# Patient Record
Sex: FEMALE | Race: ASIAN | NOT HISPANIC OR LATINO | URBAN - METROPOLITAN AREA
[De-identification: names, ages, dates, MRNs, and addresses within clinical notes are randomized per-mention and may not be internally consistent; named-entity substitution may affect disease eponyms.]

---

## 2021-02-19 ENCOUNTER — INPATIENT (INPATIENT)
Facility: HOSPITAL | Age: 31
LOS: 3 days | Discharge: HOME | End: 2021-02-23
Attending: OBSTETRICS & GYNECOLOGY | Admitting: OBSTETRICS & GYNECOLOGY
Payer: MEDICAID

## 2021-02-19 VITALS
HEART RATE: 109 BPM | RESPIRATION RATE: 16 BRPM | TEMPERATURE: 99 F | DIASTOLIC BLOOD PRESSURE: 77 MMHG | OXYGEN SATURATION: 99 % | SYSTOLIC BLOOD PRESSURE: 119 MMHG

## 2021-02-19 DIAGNOSIS — Z3A.00 WEEKS OF GESTATION OF PREGNANCY NOT SPECIFIED: ICD-10-CM

## 2021-02-19 DIAGNOSIS — O26.899 OTHER SPECIFIED PREGNANCY RELATED CONDITIONS, UNSPECIFIED TRIMESTER: ICD-10-CM

## 2021-02-19 DIAGNOSIS — Z34.80 ENCOUNTER FOR SUPERVISION OF OTHER NORMAL PREGNANCY, UNSPECIFIED TRIMESTER: ICD-10-CM

## 2021-02-19 RX ORDER — SODIUM CHLORIDE 9 MG/ML
1000 INJECTION, SOLUTION INTRAVENOUS
Refills: 0 | Status: DISCONTINUED | OUTPATIENT
Start: 2021-02-19 | End: 2021-02-20

## 2021-02-19 RX ORDER — AMPICILLIN TRIHYDRATE 250 MG
1 CAPSULE ORAL EVERY 4 HOURS
Refills: 0 | Status: DISCONTINUED | OUTPATIENT
Start: 2021-02-19 | End: 2021-02-20

## 2021-02-19 RX ORDER — AMPICILLIN TRIHYDRATE 250 MG
2 CAPSULE ORAL ONCE
Refills: 0 | Status: COMPLETED | OUTPATIENT
Start: 2021-02-19 | End: 2021-02-19

## 2021-02-19 RX ORDER — OXYTOCIN 10 UNIT/ML
333.33 VIAL (ML) INJECTION
Qty: 20 | Refills: 0 | Status: DISCONTINUED | OUTPATIENT
Start: 2021-02-19 | End: 2021-02-23

## 2021-02-19 RX ORDER — CITRIC ACID/SODIUM CITRATE 300-500 MG
15 SOLUTION, ORAL ORAL EVERY 6 HOURS
Refills: 0 | Status: DISCONTINUED | OUTPATIENT
Start: 2021-02-19 | End: 2021-02-20

## 2021-02-20 LAB
BASOPHILS # BLD AUTO: 0.02 K/UL — SIGNIFICANT CHANGE UP (ref 0–0.2)
BASOPHILS NFR BLD AUTO: 0.2 % — SIGNIFICANT CHANGE UP (ref 0–2)
BLD GP AB SCN SERPL QL: NEGATIVE — SIGNIFICANT CHANGE UP
EOSINOPHIL # BLD AUTO: 0.03 K/UL — SIGNIFICANT CHANGE UP (ref 0–0.5)
EOSINOPHIL NFR BLD AUTO: 0.3 % — SIGNIFICANT CHANGE UP (ref 0–6)
HCT VFR BLD CALC: 35.3 % — SIGNIFICANT CHANGE UP (ref 34.5–45)
HGB BLD-MCNC: 11.5 G/DL — SIGNIFICANT CHANGE UP (ref 11.5–15.5)
IMM GRANULOCYTES NFR BLD AUTO: 1.4 % — SIGNIFICANT CHANGE UP (ref 0–1.5)
LYMPHOCYTES # BLD AUTO: 17.7 % — SIGNIFICANT CHANGE UP (ref 13–44)
LYMPHOCYTES # BLD AUTO: 2.04 K/UL — SIGNIFICANT CHANGE UP (ref 1–3.3)
MCHC RBC-ENTMCNC: 30.7 PG — SIGNIFICANT CHANGE UP (ref 27–34)
MCHC RBC-ENTMCNC: 32.6 GM/DL — SIGNIFICANT CHANGE UP (ref 32–36)
MCV RBC AUTO: 94.1 FL — SIGNIFICANT CHANGE UP (ref 80–100)
MONOCYTES # BLD AUTO: 0.92 K/UL — HIGH (ref 0–0.9)
MONOCYTES NFR BLD AUTO: 8 % — SIGNIFICANT CHANGE UP (ref 2–14)
NEUTROPHILS # BLD AUTO: 8.38 K/UL — HIGH (ref 1.8–7.4)
NEUTROPHILS NFR BLD AUTO: 72.4 % — SIGNIFICANT CHANGE UP (ref 43–77)
NRBC # BLD: 0 /100 WBCS — SIGNIFICANT CHANGE UP (ref 0–0)
PLATELET # BLD AUTO: 247 K/UL — SIGNIFICANT CHANGE UP (ref 150–400)
RBC # BLD: 3.75 M/UL — LOW (ref 3.8–5.2)
RBC # FLD: 13.6 % — SIGNIFICANT CHANGE UP (ref 10.3–14.5)
RH IG SCN BLD-IMP: POSITIVE — SIGNIFICANT CHANGE UP
SARS-COV-2 IGG SERPL QL IA: NEGATIVE — SIGNIFICANT CHANGE UP
SARS-COV-2 IGM SERPL IA-ACNC: 0.07 INDEX — SIGNIFICANT CHANGE UP
SARS-COV-2 RNA SPEC QL NAA+PROBE: SIGNIFICANT CHANGE UP
T PALLIDUM AB TITR SER: NEGATIVE — SIGNIFICANT CHANGE UP
WBC # BLD: 11.55 K/UL — HIGH (ref 3.8–10.5)
WBC # FLD AUTO: 11.55 K/UL — HIGH (ref 3.8–10.5)

## 2021-02-20 RX ORDER — IBUPROFEN 200 MG
600 TABLET ORAL EVERY 6 HOURS
Refills: 0 | Status: DISCONTINUED | OUTPATIENT
Start: 2021-02-20 | End: 2021-02-23

## 2021-02-20 RX ORDER — OXYTOCIN 10 UNIT/ML
333.33 VIAL (ML) INJECTION
Qty: 20 | Refills: 0 | Status: DISCONTINUED | OUTPATIENT
Start: 2021-02-20 | End: 2021-02-23

## 2021-02-20 RX ORDER — HYDROCORTISONE 1 %
1 OINTMENT (GRAM) TOPICAL EVERY 6 HOURS
Refills: 0 | Status: DISCONTINUED | OUTPATIENT
Start: 2021-02-20 | End: 2021-02-23

## 2021-02-20 RX ORDER — SODIUM CHLORIDE 9 MG/ML
3 INJECTION INTRAMUSCULAR; INTRAVENOUS; SUBCUTANEOUS EVERY 8 HOURS
Refills: 0 | Status: DISCONTINUED | OUTPATIENT
Start: 2021-02-20 | End: 2021-02-23

## 2021-02-20 RX ORDER — TETANUS TOXOID, REDUCED DIPHTHERIA TOXOID AND ACELLULAR PERTUSSIS VACCINE, ADSORBED 5; 2.5; 8; 8; 2.5 [IU]/.5ML; [IU]/.5ML; UG/.5ML; UG/.5ML; UG/.5ML
0.5 SUSPENSION INTRAMUSCULAR ONCE
Refills: 0 | Status: DISCONTINUED | OUTPATIENT
Start: 2021-02-20 | End: 2021-02-23

## 2021-02-20 RX ORDER — SIMETHICONE 80 MG/1
80 TABLET, CHEWABLE ORAL EVERY 4 HOURS
Refills: 0 | Status: DISCONTINUED | OUTPATIENT
Start: 2021-02-20 | End: 2021-02-23

## 2021-02-20 RX ORDER — DIPHENHYDRAMINE HCL 50 MG
25 CAPSULE ORAL EVERY 6 HOURS
Refills: 0 | Status: DISCONTINUED | OUTPATIENT
Start: 2021-02-20 | End: 2021-02-23

## 2021-02-20 RX ORDER — OXYCODONE HYDROCHLORIDE 5 MG/1
5 TABLET ORAL ONCE
Refills: 0 | Status: DISCONTINUED | OUTPATIENT
Start: 2021-02-20 | End: 2021-02-23

## 2021-02-20 RX ORDER — KETOROLAC TROMETHAMINE 30 MG/ML
30 SYRINGE (ML) INJECTION ONCE
Refills: 0 | Status: DISCONTINUED | OUTPATIENT
Start: 2021-02-20 | End: 2021-02-20

## 2021-02-20 RX ORDER — OXYTOCIN 10 UNIT/ML
4 VIAL (ML) INJECTION
Qty: 30 | Refills: 0 | Status: DISCONTINUED | OUTPATIENT
Start: 2021-02-20 | End: 2021-02-23

## 2021-02-20 RX ORDER — MAGNESIUM HYDROXIDE 400 MG/1
30 TABLET, CHEWABLE ORAL
Refills: 0 | Status: DISCONTINUED | OUTPATIENT
Start: 2021-02-20 | End: 2021-02-23

## 2021-02-20 RX ORDER — AER TRAVELER 0.5 G/1
1 SOLUTION RECTAL; TOPICAL EVERY 4 HOURS
Refills: 0 | Status: DISCONTINUED | OUTPATIENT
Start: 2021-02-20 | End: 2021-02-23

## 2021-02-20 RX ORDER — DIBUCAINE 1 %
1 OINTMENT (GRAM) RECTAL EVERY 6 HOURS
Refills: 0 | Status: DISCONTINUED | OUTPATIENT
Start: 2021-02-20 | End: 2021-02-23

## 2021-02-20 RX ORDER — LANOLIN
1 OINTMENT (GRAM) TOPICAL EVERY 6 HOURS
Refills: 0 | Status: DISCONTINUED | OUTPATIENT
Start: 2021-02-20 | End: 2021-02-23

## 2021-02-20 RX ORDER — IBUPROFEN 200 MG
600 TABLET ORAL EVERY 6 HOURS
Refills: 0 | Status: COMPLETED | OUTPATIENT
Start: 2021-02-20 | End: 2022-01-19

## 2021-02-20 RX ORDER — PRAMOXINE HYDROCHLORIDE 150 MG/15G
1 AEROSOL, FOAM RECTAL EVERY 4 HOURS
Refills: 0 | Status: DISCONTINUED | OUTPATIENT
Start: 2021-02-20 | End: 2021-02-23

## 2021-02-20 RX ORDER — ACETAMINOPHEN 500 MG
975 TABLET ORAL
Refills: 0 | Status: DISCONTINUED | OUTPATIENT
Start: 2021-02-20 | End: 2021-02-23

## 2021-02-20 RX ORDER — OXYCODONE HYDROCHLORIDE 5 MG/1
5 TABLET ORAL
Refills: 0 | Status: DISCONTINUED | OUTPATIENT
Start: 2021-02-20 | End: 2021-02-23

## 2021-02-20 RX ORDER — BENZOCAINE 10 %
1 GEL (GRAM) MUCOUS MEMBRANE EVERY 6 HOURS
Refills: 0 | Status: DISCONTINUED | OUTPATIENT
Start: 2021-02-20 | End: 2021-02-23

## 2021-02-20 RX ADMIN — Medication 30 MILLIGRAM(S): at 15:04

## 2021-02-20 RX ADMIN — Medication 108 GRAM(S): at 13:03

## 2021-02-20 RX ADMIN — Medication 108 GRAM(S): at 04:30

## 2021-02-20 RX ADMIN — Medication 216 GRAM(S): at 00:16

## 2021-02-20 RX ADMIN — Medication 600 MILLIGRAM(S): at 20:00

## 2021-02-20 RX ADMIN — Medication 975 MILLIGRAM(S): at 23:10

## 2021-02-20 RX ADMIN — Medication 975 MILLIGRAM(S): at 16:34

## 2021-02-20 RX ADMIN — Medication 108 GRAM(S): at 08:45

## 2021-02-20 NOTE — OB RN DELIVERY SUMMARY - NS_SEPSISRSKCALC_OBGYN_ALL_OB_FT
EOS calculated successfully. EOS Risk Factor: 0.5/1000 live births ([facility] national incidence); GA=38w3d; Temp=98.96; ROM=18.817; GBS='Positive'; Antibiotics='GBS specific antibiotics > 2 hrs prior to birth'

## 2021-02-20 NOTE — OB PROVIDER H&P - ASSESSMENT
A&P:     29 y/o  at 38w2d admitted for PROM@6PM.    Labor: admit to L&D  -routine labs  -EFM/toco  -clear liquid, IV hydration  -induction with PO cytotec  -amp for GBS ppx    Fetal: cat 1 tracing, fetal status reassuring  GBS: pos      plan per Dr. Tung Oneill  PGY-1

## 2021-02-20 NOTE — PROVIDER CONTACT NOTE (OTHER) - REASON
pt c/o neck pain/ shoulder pain
Pt complains of head spinning, dizziness, congested/stuffed ear but not ringing; very tired

## 2021-02-20 NOTE — OB PROVIDER LABOR PROGRESS NOTE - NS_SUBJECTIVE/OBJECTIVE_OBGYN_ALL_OB_FT
PGY4 Progress Note    Subjective  Pt reexamined. SVE as below. Pt is comfortable. Pt denies any other concerns.    Objective  T(C): 36.8 (02-20-21 @ 07:25), Max: 37.0 (02-19-21 @ 22:48)  HR: 77 (02-20-21 @ 10:17) (68 - 110)  BP: 96/54 (02-20-21 @ 10:08) (90/51 - 119/77)  RR: 16 (02-20-21 @ 07:25) (16 - 16)  SpO2: 98% (02-20-21 @ 10:17) (92% - 100%)
PGY4 Progress Note    Subjective  Called to pt bedside for repeat exam. Pt reexamined. SVE as below. Pt is considering pain medication. Pt denies any other concerns.    Objective  T(C): 36.8 (02-20-21 @ 06:48), Max: 37.0 (02-19-21 @ 22:48)  HR: 85 (02-20-21 @ 07:06) (79 - 110)  BP: 99/65 (02-20-21 @ 06:48) (90/51 - 119/77)  RR: 16 (02-20-21 @ 06:48) (16 - 16)  SpO2: 98% (02-20-21 @ 07:06) (92% - 100%)

## 2021-02-20 NOTE — OB PROVIDER LABOR PROGRESS NOTE - NS_OBIHIFHRDETAILS_OBGYN_ALL_OB_FT
FHT: baseline 140, mod variability, +accels, +occasional decels
FHT: baseline 130, mod variability, +accels, -decels

## 2021-02-20 NOTE — OB PROVIDER H&P - ATTENDING COMMENTS
Attending Note    29 y/o P2@38.3wks w/IOL for PROM  GBS pos, on amp  VS stable  Reactive tracing  IOL w/cytotec, transitioned now to pit  efm/toco  anticipate     MD Dorothea

## 2021-02-20 NOTE — PROVIDER CONTACT NOTE (OTHER) - ACTION/TREATMENT ORDERED:
acknowledged and ok for pt to sleep little bit; and if still complains; orthostatics and call back; spouse is at bedside; call bell within reach; safety maintained
Toradol given w wrapped heat pad per pt request for her neck

## 2021-02-20 NOTE — PROVIDER CONTACT NOTE (OTHER) - ASSESSMENT
found pt very sleeping and she insisted to sleep for an hr. VS stable;
pt c/o soreness in neck and pain; s/w pt via translater [id number] and pt stated that her neck was hurting after her epidural but clarified it wasn't right away but shortly before she delivered. pt reports no difference in pain opon sitting up or movement

## 2021-02-20 NOTE — PRE-ANESTHESIA EVALUATION ADULT - NSANTHADDINFOFT_GEN_ALL_CORE
r/b/a including but not limited to bleeding infection back pain headache nerve injury low BP failed epidural explained to patient via .

## 2021-02-20 NOTE — OB PROVIDER H&P - HISTORY OF PRESENT ILLNESS
R1 H&P    Pt is a 31 y/o  at 38w2d presented with LOF. Patient states that around 6-7PM she noted leakage of clear fluid.   She reports irregular contractions, denies VB. FM felt.   Last PO intake 6PM  Prenatal course uncomplicated  GBS positive  EFW 3700g    OBHx:    -  -3200   -  - 3600   -  s/p D&C  GynHx: denies  PMHx: denies  PSHx: denies  Med: PNV  All: NKDA  SH: denies t/a/d

## 2021-02-20 NOTE — OB PROVIDER DELIVERY SUMMARY - NSPROVIDERDELIVERYNOTE_OBGYN_ALL_OB_FT
RML cut. Vacuum assisted vaginal delivery of liveborn infant from KIP position. Mightvac with one pull and non pop offs. Head, shoulders, and body delivered easily. Infant was suctioned. No mec. Cord was clamped and cut and infant was passed to mother. Placenta delivered intact with a 3 vessel cord. Fundal massage was given and uterine fundus was found to be firm. Vaginal exam revealed an intact cervix, vaginal walls and sulci. Patient had a 2nd degree laceration in the perineum that was repaired with 2.0 and 3.0 vicryl rapid suture. Excellent hemostasis was noted. Patient was stable. Count was correct x 2.    Rena Larose MD PGY4 RML cut. Vacuum assisted vaginal delivery of liveborn infant from KIP position. Mightvac with one pull and non pop offs. Head, shoulders, and body delivered easily. Nuchal x 1. Infant was suctioned. No mec. Cord was clamped and cut and infant was passed to mother. Placenta delivered intact with a 3 vessel cord. Fundal massage was given and uterine fundus was found to be firm. Vaginal exam revealed an intact cervix, vaginal walls and sulci. Patient had a RML in the perineum that was repaired with 2.0 and 3.0 vicryl rapid suture. Excellent hemostasis was noted. Patient was stable. Count was correct x 2.    Rena Larose MD PGY4

## 2021-02-20 NOTE — PROVIDER CONTACT NOTE (OTHER) - BACKGROUND
s/p Vacuum delivery ; pt received an epidural in the am about 730 AM
multip; vacc assist ; ; s/p cytotec; Romansh speaking

## 2021-02-20 NOTE — OB PROVIDER H&P - NSHPPHYSICALEXAM_GEN_ALL_CORE
VS:  Vital Signs Last 24 Hrs  T(C): 37 (20 Feb 2021 00:01), Max: 37.0 (19 Feb 2021 22:48)  T(F): 98.6 (20 Feb 2021 00:01), Max: 98.6 (19 Feb 2021 22:48)  HR: 104 (20 Feb 2021 00:33) (91 - 110)  BP: 119/77 (20 Feb 2021 00:01) (119/77 - 119/77)  BP(mean): --  RR: 16 (20 Feb 2021 00:01) (16 - 16)  SpO2: 96% (20 Feb 2021 00:33) (96% - 100%)    GA: NAD  Cards: RRR  Pulm: CTAB  EFH: baseline 140 ,moderate variability, +accels, -decels   [place]: irregular  VE:2.5/60/-3  TAUS: vertex

## 2021-02-20 NOTE — OB PROVIDER LABOR PROGRESS NOTE - ASSESSMENT
Assessment  induction of labor for PROM    Plan  continue Pitocin      Discussed with attending physician, Dr. Ruby.    Rena Larose MD PGY4
Assessment  induction of labor for PROM 2/19 @6p    Plan  discontinue PO Cytotec, start Pitocin for induction  anesthesia called for epidural       Discussed with attending physician, Dr. Ruby.    Rena Larose MD PGY4

## 2021-02-21 RX ORDER — METOCLOPRAMIDE HCL 10 MG
5 TABLET ORAL EVERY 8 HOURS
Refills: 0 | Status: DISCONTINUED | OUTPATIENT
Start: 2021-02-21 | End: 2021-02-21

## 2021-02-21 RX ORDER — DIPHENHYDRAMINE HCL 50 MG
25 CAPSULE ORAL ONCE
Refills: 0 | Status: COMPLETED | OUTPATIENT
Start: 2021-02-21 | End: 2021-02-21

## 2021-02-21 RX ORDER — SODIUM CHLORIDE 9 MG/ML
1000 INJECTION, SOLUTION INTRAVENOUS ONCE
Refills: 0 | Status: COMPLETED | OUTPATIENT
Start: 2021-02-21 | End: 2021-02-21

## 2021-02-21 RX ORDER — DIPHENHYDRAMINE HCL 50 MG
20 CAPSULE ORAL EVERY 8 HOURS
Refills: 0 | Status: DISCONTINUED | OUTPATIENT
Start: 2021-02-21 | End: 2021-02-22

## 2021-02-21 RX ORDER — METOCLOPRAMIDE HCL 10 MG
10 TABLET ORAL ONCE
Refills: 0 | Status: COMPLETED | OUTPATIENT
Start: 2021-02-21 | End: 2021-02-21

## 2021-02-21 RX ORDER — SODIUM CHLORIDE 9 MG/ML
1000 INJECTION, SOLUTION INTRAVENOUS
Refills: 0 | Status: DISCONTINUED | OUTPATIENT
Start: 2021-02-21 | End: 2021-02-21

## 2021-02-21 RX ADMIN — Medication 975 MILLIGRAM(S): at 18:02

## 2021-02-21 RX ADMIN — Medication 600 MILLIGRAM(S): at 09:12

## 2021-02-21 RX ADMIN — Medication 25 MILLIGRAM(S): at 04:56

## 2021-02-21 RX ADMIN — Medication 600 MILLIGRAM(S): at 20:05

## 2021-02-21 RX ADMIN — Medication 20 MILLIGRAM(S): at 12:18

## 2021-02-21 RX ADMIN — Medication 20 MILLIGRAM(S): at 21:36

## 2021-02-21 RX ADMIN — Medication 975 MILLIGRAM(S): at 12:05

## 2021-02-21 RX ADMIN — Medication 975 MILLIGRAM(S): at 06:29

## 2021-02-21 RX ADMIN — Medication 600 MILLIGRAM(S): at 15:05

## 2021-02-21 RX ADMIN — SODIUM CHLORIDE 2000 MILLILITER(S): 9 INJECTION, SOLUTION INTRAVENOUS at 11:16

## 2021-02-21 RX ADMIN — OXYCODONE HYDROCHLORIDE 5 MILLIGRAM(S): 5 TABLET ORAL at 15:05

## 2021-02-21 RX ADMIN — Medication 10 MILLIGRAM(S): at 04:57

## 2021-02-21 RX ADMIN — OXYCODONE HYDROCHLORIDE 5 MILLIGRAM(S): 5 TABLET ORAL at 09:12

## 2021-02-21 RX ADMIN — Medication 600 MILLIGRAM(S): at 02:55

## 2021-02-21 RX ADMIN — OXYCODONE HYDROCHLORIDE 5 MILLIGRAM(S): 5 TABLET ORAL at 12:05

## 2021-02-21 RX ADMIN — Medication 5 MILLIGRAM(S): at 11:14

## 2021-02-21 RX ADMIN — SODIUM CHLORIDE 3 MILLILITER(S): 9 INJECTION INTRAMUSCULAR; INTRAVENOUS; SUBCUTANEOUS at 20:22

## 2021-02-21 RX ADMIN — OXYCODONE HYDROCHLORIDE 5 MILLIGRAM(S): 5 TABLET ORAL at 02:59

## 2021-02-21 RX ADMIN — OXYCODONE HYDROCHLORIDE 5 MILLIGRAM(S): 5 TABLET ORAL at 21:36

## 2021-02-21 RX ADMIN — Medication 1 TABLET(S): at 12:07

## 2021-02-21 RX ADMIN — SODIUM CHLORIDE 1000 MILLILITER(S): 9 INJECTION, SOLUTION INTRAVENOUS at 07:27

## 2021-02-21 RX ADMIN — Medication 5 MILLIGRAM(S): at 20:03

## 2021-02-21 RX ADMIN — OXYCODONE HYDROCHLORIDE 5 MILLIGRAM(S): 5 TABLET ORAL at 18:03

## 2021-02-21 NOTE — PROVIDER CONTACT NOTE (CHANGE IN STATUS NOTIFICATION) - ASSESSMENT
Pt feels better laying down than sitting up, expressing pain 10/10. New complaint of nausea, pt is groaning, grimacing, squirming

## 2021-02-21 NOTE — PROVIDER CONTACT NOTE (CHANGE IN STATUS NOTIFICATION) - ACTION/TREATMENT ORDERED:
indicated she will speak with the chief to come see the pt  indicated she will speak with the chief to come see the pt.  contacted and acknowledge awareness of the issue. Anesthesiology will see the pt in the morning. Dr. Hurst ordering 1L of fluid, benadryl and reglan.

## 2021-02-21 NOTE — PROVIDER CONTACT NOTE (CHANGE IN STATUS NOTIFICATION) - BACKGROUND
KRISS from 1359 complaining of head and neck pain after admission. Pt received epidural at delivery. Dr. Hurst saw pt at 1900 and indicated to give coffee, and ice packs. Tylenol given at 1800. Motrin and oxy given at time of major complaint post waking up

## 2021-02-21 NOTE — PROGRESS NOTE ADULT - SUBJECTIVE AND OBJECTIVE BOX
OB Progress Note: VAVD PPD#1    S: 31yo  PPD#1 s/p VAVD. Approximately 6pm yesterday patient complained of headache and neck pain that upon evaluation appeared MSK in origin and was given Tylenol, Motrin and heat packs. At 3AM patient stated that headache had worsened to 10/10 pain. The pain is located bitemporally and in her neck and is equally bad whether lying down or sitting. At time of evaluation patient had received oxycodone one hour before which had not helped. She is nauseous from the pain but has not vomited and tolerated PO prior to the headache. She denies CP, SOB, or heavy vaginal bleeding.     O:  Vitals:  Vital Signs Last 24 Hrs  T(C): 37 (2021 21:02), Max: 37.2 (2021 10:47)  T(F): 98.6 (2021 21:02), Max: 98.96 (2021 10:47)  HR: 88 (2021 21:) (68 - 133)  BP: 99/65 (2021 21:02) (90/51 - 117/65)  BP(mean): --  RR: 18 (2021 21:02) (16 - 18)  SpO2: 96% (2021 21:) (88% - 100%)    MEDICATIONS  (STANDING):  acetaminophen   Tablet .. 975 milliGRAM(s) Oral <User Schedule>  diphtheria/tetanus/pertussis (acellular) Vaccine (ADAcel) 0.5 milliLiter(s) IntraMuscular once  ibuprofen  Tablet. 600 milliGRAM(s) Oral every 6 hours  oxytocin Infusion 333.333 milliUNIT(s)/Min (1000 mL/Hr) IV Continuous <Continuous>  oxytocin Infusion 4 milliUNIT(s)/Min (4 mL/Hr) IV Continuous <Continuous>  oxytocin Infusion 333.333 milliUNIT(s)/Min (1000 mL/Hr) IV Continuous <Continuous>  prenatal multivitamin 1 Tablet(s) Oral daily  sodium chloride 0.9% lock flush 3 milliLiter(s) IV Push every 8 hours      Labs:  Blood type: B Positive  Rubella IgG: RPR: Negative                          11.5   11.55<H> >-----------< 247    (  @ 00:41 )             35.3    Physical Exam:  General: NAD  Abdomen: soft, appropriately tender, non-distended, fundus firm  Vaginal: Lochia wnl    A/P: 31yo PPD#1 s/p .  Patient is stable and doing well post-partum.   - Pain well controlled, continue current pain regimen  - Increase ambulation, SCDs when not ambulating  - Continue regular diet    ANTONINA Hurst, PGY1 OB Progress Note: VAVD PPD#1    S: 29yo  PPD#1 s/p VAVD. Approximately 6pm yesterday patient complained of headache and neck pain that upon evaluation appeared MSK in origin and was given Tylenol, Motrin and heat packs. At 3AM patient stated that headache had worsened to 10/10 pain. The pain is located bitemporally and in her neck and is equally bad whether lying down or sitting. At time of evaluation patient had received oxycodone one hour before which had not helped. She is nauseous from the pain but has not vomited and tolerated PO prior to the headache. She denies CP, SOB, or heavy vaginal bleeding.     O:  Vitals:  Vital Signs Last 24 Hrs  T(C): 37 (2021 21:02), Max: 37.2 (2021 10:47)  T(F): 98.6 (2021 21:02), Max: 98.96 (2021 10:47)  HR: 88 (2021 21:) (68 - 133)  BP: 99/65 (2021 21:02) (90/51 - 117/65)  BP(mean): --  RR: 18 (2021 21:02) (16 - 18)  SpO2: 96% (2021 21:) (88% - 100%)    MEDICATIONS  (STANDING):  acetaminophen   Tablet .. 975 milliGRAM(s) Oral <User Schedule>  diphtheria/tetanus/pertussis (acellular) Vaccine (ADAcel) 0.5 milliLiter(s) IntraMuscular once  ibuprofen  Tablet. 600 milliGRAM(s) Oral every 6 hours  oxytocin Infusion 333.333 milliUNIT(s)/Min (1000 mL/Hr) IV Continuous <Continuous>  oxytocin Infusion 4 milliUNIT(s)/Min (4 mL/Hr) IV Continuous <Continuous>  oxytocin Infusion 333.333 milliUNIT(s)/Min (1000 mL/Hr) IV Continuous <Continuous>  prenatal multivitamin 1 Tablet(s) Oral daily  sodium chloride 0.9% lock flush 3 milliLiter(s) IV Push every 8 hours    Labs:  Blood type: B Positive  Rubella IgG: RPR: Negative                          11.5   11.55<H> >-----------< 247    (  @ 00:41 )             35.3    Physical Exam:  General: Patient lying in bed in pain  Neuro: CN II-XII intact, moving all extremities spontaneously  Neck: Point tenderness along trapezius; ROM limited by pain  Abdomen: soft, appropriately tender, non-distended, fundus firm  Vaginal: Lochia wnl

## 2021-02-21 NOTE — PROGRESS NOTE ADULT - SUBJECTIVE AND OBJECTIVE BOX
Anesthesia called to bedside for evaluation of headache.  Patient is PPD1 w/ epidural placement on 2/20/21.  Patient states that she has been experiencing headache and neck pain since delivery. She states that pain has been 8/10, constant, located bitemporally and between eyes, and is "both sharp and dull". The pain is better when sitting up compared to laying down. Chart reviewed; no note of difficulty w/ epidural placement.  However S/S consistent w/ PDPH.  All options explained to patient and , including conservative management and EBP.  Patient does not wish to proceed w/ EBP at this time and opts for conservative management.  Given the severity of her symptoms, I explained the likelihood of conservative management failure.  Anesthesia will continue to follow and offer EBP at patient's request.

## 2021-02-21 NOTE — PROGRESS NOTE ADULT - ASSESSMENT
Assessment: 29yo  PPD#1 s/p VAVD w/postpartum course c/b severe headache not relieved by Tylenol, Motrin or Oxycodone. BP has been wnl and neuro exam intact.     Plan:  - Consult anesthesia for spinal headache evaluation   - Reglan and benadryl for pain   - Consider imaging if pain does not improve  - Increase ambulation, SCDs when not ambulating  - Continue regular diet    ANTONINA Hurst, PGY1

## 2021-02-22 RX ORDER — BENZOCAINE 10 %
1 GEL (GRAM) MUCOUS MEMBRANE
Qty: 0 | Refills: 0 | DISCHARGE
Start: 2021-02-22

## 2021-02-22 RX ORDER — MAGNESIUM HYDROXIDE 400 MG/1
30 TABLET, CHEWABLE ORAL
Qty: 0 | Refills: 0 | DISCHARGE
Start: 2021-02-22

## 2021-02-22 RX ORDER — LANOLIN
1 OINTMENT (GRAM) TOPICAL
Qty: 0 | Refills: 0 | DISCHARGE
Start: 2021-02-22

## 2021-02-22 RX ORDER — SIMETHICONE 80 MG/1
1 TABLET, CHEWABLE ORAL
Qty: 0 | Refills: 0 | DISCHARGE
Start: 2021-02-22

## 2021-02-22 RX ORDER — HYDROCORTISONE 1 %
1 OINTMENT (GRAM) TOPICAL
Qty: 0 | Refills: 0 | DISCHARGE
Start: 2021-02-22

## 2021-02-22 RX ORDER — METOCLOPRAMIDE HCL 10 MG
10 TABLET ORAL EVERY 6 HOURS
Refills: 0 | Status: DISCONTINUED | OUTPATIENT
Start: 2021-02-22 | End: 2021-02-22

## 2021-02-22 RX ORDER — IBUPROFEN 200 MG
1 TABLET ORAL
Qty: 0 | Refills: 0 | DISCHARGE
Start: 2021-02-22

## 2021-02-22 RX ORDER — AER TRAVELER 0.5 G/1
1 SOLUTION RECTAL; TOPICAL
Qty: 0 | Refills: 0 | DISCHARGE
Start: 2021-02-22

## 2021-02-22 RX ORDER — METOCLOPRAMIDE HCL 10 MG
5 TABLET ORAL EVERY 8 HOURS
Refills: 0 | Status: DISCONTINUED | OUTPATIENT
Start: 2021-02-22 | End: 2021-02-22

## 2021-02-22 RX ORDER — METOCLOPRAMIDE HCL 10 MG
10 TABLET ORAL EVERY 8 HOURS
Refills: 0 | Status: DISCONTINUED | OUTPATIENT
Start: 2021-02-22 | End: 2021-02-22

## 2021-02-22 RX ORDER — ACETAMINOPHEN 500 MG
3 TABLET ORAL
Qty: 0 | Refills: 0 | DISCHARGE
Start: 2021-02-22

## 2021-02-22 RX ADMIN — Medication 600 MILLIGRAM(S): at 09:10

## 2021-02-22 RX ADMIN — OXYCODONE HYDROCHLORIDE 5 MILLIGRAM(S): 5 TABLET ORAL at 03:17

## 2021-02-22 RX ADMIN — Medication 975 MILLIGRAM(S): at 00:24

## 2021-02-22 RX ADMIN — Medication 5 MILLIGRAM(S): at 11:08

## 2021-02-22 RX ADMIN — OXYCODONE HYDROCHLORIDE 5 MILLIGRAM(S): 5 TABLET ORAL at 06:04

## 2021-02-22 RX ADMIN — Medication 1 TABLET(S): at 12:00

## 2021-02-22 RX ADMIN — Medication 975 MILLIGRAM(S): at 12:11

## 2021-02-22 RX ADMIN — Medication 975 MILLIGRAM(S): at 18:22

## 2021-02-22 RX ADMIN — Medication 975 MILLIGRAM(S): at 06:04

## 2021-02-22 RX ADMIN — Medication 600 MILLIGRAM(S): at 15:26

## 2021-02-22 RX ADMIN — Medication 600 MILLIGRAM(S): at 03:17

## 2021-02-22 RX ADMIN — SODIUM CHLORIDE 3 MILLILITER(S): 9 INJECTION INTRAMUSCULAR; INTRAVENOUS; SUBCUTANEOUS at 06:07

## 2021-02-22 RX ADMIN — Medication 5 MILLIGRAM(S): at 03:20

## 2021-02-22 RX ADMIN — Medication 5 MILLIGRAM(S): at 18:22

## 2021-02-22 RX ADMIN — Medication 600 MILLIGRAM(S): at 20:16

## 2021-02-22 RX ADMIN — OXYCODONE HYDROCHLORIDE 5 MILLIGRAM(S): 5 TABLET ORAL at 00:24

## 2021-02-22 NOTE — DISCHARGE NOTE OB - CARE PLAN
Principal Discharge DX:	Vaginal delivery  Goal:	recovery  Assessment and plan of treatment:	Take tylenol and motrin as directed, alternating every 3 hours.   Continue iron and prenatal vitamin daily. Take colace and mylicon as needed for constipation and gas pain.   Nothing in the vagina and no exercise until 6 week visit. You may continue bleeding for several weeks.  Follow up in the office in 6 weeks for full postpartum visit.   Call the office for appointment confirmation and with any concerns, including breast infection, wound infection, postpartum depression, high blood pressure, and painful calves.

## 2021-02-22 NOTE — DISCHARGE NOTE OB - HOSPITAL COURSE
P0 at term delivery after induction of labor for rupture of membranes. Vacuum assisted delivery due to nonreassuring fetal status during pushing. RML laceration and cytotec given. Postpartum course complicated by postdural puncture headache. Patient declined blood patch and improved with conservative management. Baby boy circumcised. Cleared for discharge and passed all milestones on PPD#2.  P3 s/p term delivery after induction of labor for rupture of membranes. Vacuum assisted delivery due to nonreassuring fetal status during pushing. RML laceration and cytotec given. Postpartum course complicated by postdural puncture headache. Patient initially declined blood patch and did not improve with conservative management. Received blood patch on PPD#2. Baby boy circumcised. Cleared for discharge and passed all milestones on PPD#2.  P3 s/p term delivery after induction of labor for rupture of membranes. Vacuum assisted delivery due to nonreassuring fetal status during pushing. RML laceration and cytotec given. Postpartum course complicated by postdural puncture headache. Patient initially declined blood patch and did not improve with conservative management. Received blood patch on PPD#2 with mild improvement. Patient kept overnight for additional monitoring.  Baby boy circumcised. Cleared for discharge and passed all milestones on PPD#3.  P3 s/p term delivery after induction of labor for rupture of membranes. Vacuum assisted delivery due to nonreassuring fetal status during pushing. RML laceration and cytotec given. Postpartum course complicated by postdural puncture headache. Patient initially declined blood patch and did not improve with conservative management. Received blood patch on PPD#2 with mild improvement. Patient kept overnight for additional monitoring.  Baby boy circumcised. Cleared for discharge and passed all milestones on PPD#3.   Doing well post blood patch.  korir

## 2021-02-22 NOTE — PROGRESS NOTE ADULT - SUBJECTIVE AND OBJECTIVE BOX
Patient s/p epidural blood patch.   She is expressing mild improvement although the neck discomfort persists.  Will follow.    g. Palleschi

## 2021-02-22 NOTE — DISCHARGE NOTE OB - PATIENT PORTAL LINK FT
You can access the FollowMyHealth Patient Portal offered by [facility] by registering at the following website: [email or web address]. By joining [email or web address] FollowMyHealth portal, you will also be able to view your health information using other applications (apps) compatible with our system.

## 2021-02-22 NOTE — PROGRESS NOTE ADULT - ATTENDING COMMENTS
P3 PPD#2 s/p VAVD with RML, postpartum course complicated by postdural puncture headache. Pt undergoing conservative mgmt yesterday. Reports this morning still with 6/10 headache, neck/shoulder pain. Desires blood patch today. Anesthesia notified.   Otherwise pt feeling well, voiding, cramping occasional, normal lochia. Passing all PP milestones.   Plan for pt to go home after blood patch procedure as long as uncomplicated.   baby boy doing well.   routine pp care discussed. f/u in office 6wks.   Elida XAVIER
PPD#1  VS stable, voiding spontaneously  pt w/HA overnight (10/10) intensity - symp improved w/tylenol/benadryl/reglan. however, pt notes HA 8/10, neck pain 6/10 this AM.  pt denies any associated neuro symp (neuro exam intact at bedside). HA improved w/laying down, worse w/standing/sitting - consistent w/spinal HA  anesthesia consulted for evaluation, possible blood patch  will continue w/tylenol, motrin, reglan, hydration in the meantime  will continue to monitor symptoms, consider imaging if any neuro symp, if pain persists despite possible anesthesia interventions  otherwise, meeting postpartum milestones    MD Dorothea

## 2021-02-22 NOTE — PROGRESS NOTE ADULT - SUBJECTIVE AND OBJECTIVE BOX
OB Progress Note: VAVD PPD#2    S: 31yo  PPD#2 s/p VAVD. Postpartum course c/b spinal headache; patient declined blood patch. This AM patient states that pain is significantly improved. She is tolerating a regular diet, passing flatus, voiding spontaneously, and ambulating without difficulty. Denies CP, SOB, N/V, or heavy vaginal bleeding.     O:  Vitals:  Vital Signs Last 24 Hrs  T(C): 36.4 (2021 06:09), Max: 37 (2021 13:00)  T(F): 97.6 (2021 06:09), Max: 98.6 (2021 13:00)  HR: 65 (2021 06:09) (58 - 72)  BP: 97/62 (2021 06:09) (92/60 - 105/69)  BP(mean): --  RR: 18 (2021 06:09) (18 - 18)  SpO2: 97% (2021 06:09) (97% - 99%)    MEDICATIONS  (STANDING):  acetaminophen   Tablet .. 975 milliGRAM(s) Oral <User Schedule>  diphenhydrAMINE   Injectable 20 milliGRAM(s) IV Push every 8 hours  diphtheria/tetanus/pertussis (acellular) Vaccine (ADAcel) 0.5 milliLiter(s) IntraMuscular once  ibuprofen  Tablet. 600 milliGRAM(s) Oral every 6 hours  metoclopramide Injectable 5 milliGRAM(s) IV Push every 8 hours  oxytocin Infusion 333.333 milliUNIT(s)/Min (1000 mL/Hr) IV Continuous <Continuous>  oxytocin Infusion 4 milliUNIT(s)/Min (4 mL/Hr) IV Continuous <Continuous>  oxytocin Infusion 333.333 milliUNIT(s)/Min (1000 mL/Hr) IV Continuous <Continuous>  prenatal multivitamin 1 Tablet(s) Oral daily  sodium chloride 0.9% lock flush 3 milliLiter(s) IV Push every 8 hours      Labs:  Blood type: B Positive  Rubella IgG: RPR: Negative                          11.5   11.55<H> >-----------< 247    (  @ 00:41 )             35.3        Physical Exam:  General: NAD  Abdomen: soft, appropriately tender, non-distended, fundus firm  Vaginal: Lochia wnl

## 2021-02-22 NOTE — DISCHARGE NOTE OB - MEDICATION SUMMARY - MEDICATIONS TO TAKE
I will START or STAY ON the medications listed below when I get home from the hospital:    acetaminophen 325 mg oral tablet  -- 3 tab(s) by mouth   -- Indication: For vaginal delivery    ibuprofen 600 mg oral tablet  -- 1 tab(s) by mouth every 6 hours  -- Indication: For vaginal delivery    magnesium hydroxide 8% oral suspension  -- 30 milliliter(s) by mouth 2 times a day, As needed, Constipation  -- Indication: For vaginal delivery    benzocaine 20% topical spray  -- 1 spray(s) on skin every 6 hours, As needed, for Perineal discomfort  -- Indication: For vaginal delivery    hydrocortisone 1% topical cream  -- 1 application on skin every 6 hours, As needed, Moderate Pain (4-6)  -- Indication: For vaginal delivery    lanolin topical ointment  -- 1 application on skin every 6 hours, As needed, nipple soreness  -- Indication: For vaginal delivery    witch hazel 50% topical pad  -- 1 application on skin every 4 hours, As needed, Perineal discomfort  -- Indication: For vaginal delivery    Prenatal Multivitamins with Folic Acid 1 mg oral tablet  -- 1 tab(s) by mouth once a day  -- Indication: For vaginal delivery    simethicone 80 mg oral tablet, chewable  -- 1 tab(s) by mouth every 4 hours, As needed, Gas  -- Indication: For vaginal delivery

## 2021-02-22 NOTE — DISCHARGE NOTE OB - CARE PROVIDER_API CALL
Michelle Casper)  Obstetrics and Gynecology  [address] [facility], [address] [place] [address]  Phone: [phone]  Fax: [phone]  Follow Up Time:

## 2021-02-22 NOTE — PROGRESS NOTE ADULT - ASSESSMENT
A/P: 29yo  PPD#2 s/p VAVD w/course c/b spinal headache. Headache is significantly improved and patient is meeting appropriate milestones.    - Continue to monitor for headache and ensure adequate pain control   - Increase ambulation, SCDs when not ambulating  - Continue regular diet    ANTONINA Hurst, PGY1

## 2021-02-22 NOTE — CHART NOTE - NSCHARTNOTEFT_GEN_A_CORE
Attending Note    Spoke to pt (via [place] ) about HA. Pt evaluated by anesthesia - agree likely spinal HA. Pt nervous to try this treatment at this time (procedure, r/b/a explained to her by anesthesia - reviewed). Will continue w/conservative management at this time (pain meds/benadryl/reglan/caffeine pills). Pt to be re-assessed for blood patch.    MD Dorothea
P3 s/p VAVD 2/20, postpartum complicated by spinal headache. Now s/p blood patch with only some improvement.   Patient reports still feeling neck pain, extreme exhaustion.   Evaluated by OB PA.   Will keep patient overnight to monitor for improvement in symptoms by tomorrow AM.   Plan for possible d/c tomorrow if improved.   Elida XAVIER
Persian  #[id number]    31 yo  PPD#0 s/p VAVD evaluated at bedside for headache and neck pain since delivery. She states that pain has been 5/10, constant, located bitemporally and between eyes, and is "both sharp and dull". The pain has worsened since she woke up from a nap and is better when sitting up than when lying down. Tylenol has helped slightly. The patient drinks coffee daily and did not drink coffee today. The patient also feels neck pain and stiffness and feels pain with any neck movement.     Vitals:    T(C): 36.8 (21 @ 17:15), Max: 37.2 (21 @ 10:47)  HR: 83 (21 @ 17:15) (68 - 133)  BP: 97/63 (21 @ 17:15) (90/51 - 119/77)  RR: 18 (21 @ 18:45) (16 - 18)  SpO2: 99% (21 @ 18:45) (88% - 100%)    Physical Exam:   General: Patient sitting straight up in bed, appears uncomfortable, sitting stiffly  Neck: ROM limited by pain in all directions. Point tenderness along trapezius.   CV: RRR  Resp: no increased WOB  Neuro: Brudzinski sign negative, CN II-XII intact     Assessment:  31 yo  PPD#0 s/p VAVD complaining of bitemporal and frontal headache and neck pain, better sitting than lying down, w/point tenderness along trapezius muscle, VS wnl. Counseled patient that this is unlikely to be spinal headache and more likely MSK related from neck flexion during delivery.    Plan:    - Motrin 600 due in 15 min  - Ice packs x30 min 2-3 times per day   - Reevaluate if worsening or if neuro symptoms develop     ANTONINA Hurst, PGY1  D/w Dr. Ruby
Attending Note    Pt examined at bedside.     Vital Signs Last 24 Hrs  T(C): 37.2 (20 Feb 2021 10:47), Max: 37.2 (20 Feb 2021 10:47)  T(F): 98.96 (20 Feb 2021 10:47), Max: 98.96 (20 Feb 2021 10:47)  HR: 81 (20 Feb 2021 11:57) (68 - 110)  BP: 107/57 (20 Feb 2021 11:55) (90/51 - 119/77)  BP(mean): --  RR: 16 (20 Feb 2021 07:25) (16 - 16)  SpO2: 99% (20 Feb 2021 11:57) (88% - 100%)    VE: 4.5/70/-2  EFM: 125bl/mod/+accels/some variable and early decels noted  [place]:q2-3min    Plan:  -pit paused for tracing to recover  -efm/toco  -resuscitate w/repositioning, IVF    MD Dorothea
Attending Note    Pt examined for cervical change. Noted variable and early decels.    VS stable    VE: 6.5/70/-2  EFM: 125bl/mod/+accels/variable and early decels  [place]: q2min    Plan:  -pit paused at this time  -c/w resuscitation  -anticipate     MD Dorothea

## 2021-02-22 NOTE — PROGRESS NOTE ADULT - SUBJECTIVE AND OBJECTIVE BOX
Called to evaluate this 30 year old s/p vaginal delivery with an epidural 2 days ago.   Shortly following delivery, the patient indicates that she developed a headache.   The headache is positional in nature--worse on sitting up and better lying down.  She denies any photophobia, diplopia or hearing disturbance.  For the past 24 hours she has tried conservative therapies including fluids and pain relievers.  We had a long discussion regarding epidural blood patch including the risks and benefits.   The risks include risk of HA, infection and neural damage.  The EBP is approx 75% effective.    At this time, the patient wishes to proceed with EBP.    Physical examination remarkable for the presence of 3-sequential epidural puncture sites.    G. Palleschi, MD

## 2021-02-23 VITALS
RESPIRATION RATE: 18 BRPM | DIASTOLIC BLOOD PRESSURE: 71 MMHG | OXYGEN SATURATION: 99 % | HEART RATE: 66 BPM | SYSTOLIC BLOOD PRESSURE: 106 MMHG | TEMPERATURE: 98 F

## 2021-02-23 PROCEDURE — 85025 COMPLETE CBC W/AUTO DIFF WBC: CPT

## 2021-02-23 PROCEDURE — U0005: CPT

## 2021-02-23 PROCEDURE — 86769 SARS-COV-2 COVID-19 ANTIBODY: CPT

## 2021-02-23 PROCEDURE — 59050 FETAL MONITOR W/REPORT: CPT

## 2021-02-23 PROCEDURE — 59025 FETAL NON-STRESS TEST: CPT

## 2021-02-23 PROCEDURE — 86780 TREPONEMA PALLIDUM: CPT

## 2021-02-23 PROCEDURE — 86850 RBC ANTIBODY SCREEN: CPT

## 2021-02-23 PROCEDURE — 86900 BLOOD TYPING SEROLOGIC ABO: CPT

## 2021-02-23 PROCEDURE — 87635 SARS-COV-2 COVID-19 AMP PRB: CPT

## 2021-02-23 PROCEDURE — 86901 BLOOD TYPING SEROLOGIC RH(D): CPT

## 2021-02-23 PROCEDURE — G0463: CPT

## 2021-02-23 RX ADMIN — Medication 975 MILLIGRAM(S): at 12:13

## 2021-02-23 RX ADMIN — Medication 600 MILLIGRAM(S): at 09:23

## 2021-02-23 RX ADMIN — Medication 975 MILLIGRAM(S): at 00:30

## 2021-02-23 RX ADMIN — Medication 1 TABLET(S): at 12:13

## 2021-02-23 RX ADMIN — Medication 975 MILLIGRAM(S): at 05:44

## 2021-02-23 NOTE — PROGRESS NOTE ADULT - SUBJECTIVE AND OBJECTIVE BOX
S: Patient doing well. Minimal lochia. Pain controlled.  Sp spinal headache with patch now feeling better  O: Vital Signs Last 24 Hrs  T(C): 36.4 (2021 06:30), Max: 36.7 (2021 12:11)  T(F): 97.6 (2021 06:30), Max: 98 (2021 12:11)  HR: 64 (2021 06:30) (60 - 67)  BP: 104/70 (2021 06:30) (97/64 - 109/72)  BP(mean): --  RR: 18 (2021 06:30) (18 - 18)  SpO2: 99% (2021 06:30) (96% - 99%)    Gen: NAD  Abd: soft, NT, ND, fundus firm below umbilicus  Lochia: moderate  Ext: no tenderness    Labs:      A: 30y PPD#2 s/p  doing well post spinal blood patch.    Plan:  regular diet  pain rx upon request  routine pp care  nothing per vagina  jkmr S: Patient doing well. Minimal lochia. Pain controlled.  Sp spinal headache with patch now feeling better  O: Vital Signs Last 24 Hrs  T(C): 36.4 (2021 06:30), Max: 36.7 (2021 12:11)  T(F): 97.6 (2021 06:30), Max: 98 (2021 12:11)  HR: 64 (2021 06:30) (60 - 67)  BP: 104/70 (2021 06:30) (97/64 - 109/72)  BP(mean): --  RR: 18 (2021 06:30) (18 - 18)  SpO2: 99% (2021 06:30) (96% - 99%)    Gen: NAD  Abd: soft, NT, ND, fundus firm below umbilicus  Lochia: moderate  Ext: no tenderness    Labs:      A: 30y PPD#3 s/p  doing well post spinal blood patch.    Plan:  regular diet  pain rx upon request  routine pp care  nothing per vagina  jkmr

## 2021-02-23 NOTE — PROGRESS NOTE ADULT - ASSESSMENT
A/P: 29yo  PPD#3 s/p VAVD w/postpartum course c/b spinal headache, treated successfully yesterday with a blood patch.  Patient is stable with adequate pain control and doing well post-partum.     - Continue current pain regimen  - Increase ambulation, SCDs when not ambulating  - Continue regular diet    ANTONINA Hurst, PGY1

## 2021-02-23 NOTE — PROGRESS NOTE ADULT - SUBJECTIVE AND OBJECTIVE BOX
OB Progress Note: VAVD PPD#3    S: 29yo  PPD#3 s/p VAVD. Postpartum course c/b spinal headache which was treated with a blood patch yesterday. Patient feels significantly improved today. She is tolerating a regular diet, passing flatus, voiding spontaneously, and ambulating without difficulty. Denies headache, lightheadedness, CP, SOB, N/V, or heavy vaginal bleeding.     O:  Vitals:  Vital Signs Last 24 Hrs  T(C): 36.4 (2021 06:30), Max: 36.7 (2021 12:11)  T(F): 97.6 (2021 06:30), Max: 98 (2021 12:11)  HR: 64 (2021 06:30) (60 - 67)  BP: 104/70 (2021 06:30) (97/64 - 109/72)  BP(mean): --  RR: 18 (2021 06:30) (18 - 18)  SpO2: 99% (2021 06:30) (96% - 99%)    MEDICATIONS  (STANDING):  acetaminophen   Tablet .. 975 milliGRAM(s) Oral <User Schedule>  diphtheria/tetanus/pertussis (acellular) Vaccine (ADAcel) 0.5 milliLiter(s) IntraMuscular once  ibuprofen  Tablet. 600 milliGRAM(s) Oral every 6 hours  oxytocin Infusion 333.333 milliUNIT(s)/Min (1000 mL/Hr) IV Continuous <Continuous>  oxytocin Infusion 4 milliUNIT(s)/Min (4 mL/Hr) IV Continuous <Continuous>  oxytocin Infusion 333.333 milliUNIT(s)/Min (1000 mL/Hr) IV Continuous <Continuous>  prenatal multivitamin 1 Tablet(s) Oral daily  sodium chloride 0.9% lock flush 3 milliLiter(s) IV Push every 8 hours      Labs:  Blood type: B Positive  Rubella IgG: RPR: Negative      Physical Exam:  General: NAD  Abdomen: soft, appropriately tender, non-distended, fundus firm  Vaginal: Lochia wnl

## 2021-02-23 NOTE — PROGRESS NOTE ADULT - ASSESSMENT
ppd2  doing well  sp blood patch and no longer with headache  bonding with baby  happy ppd3  doing well  sp blood patch and no longer with headache  bonding with baby  happy

## 2021-07-24 ENCOUNTER — EMERGENCY (EMERGENCY)
Facility: HOSPITAL | Age: 31
LOS: 1 days | Discharge: HOME | End: 2021-07-24
Attending: EMERGENCY MEDICINE | Admitting: EMERGENCY MEDICINE
Payer: MEDICAID

## 2021-07-24 VITALS — TEMPERATURE: 98 F | DIASTOLIC BLOOD PRESSURE: 73 MMHG | SYSTOLIC BLOOD PRESSURE: 110 MMHG | HEART RATE: 73 BPM

## 2021-07-24 VITALS
OXYGEN SATURATION: 100 % | TEMPERATURE: 99 F | HEART RATE: 100 BPM | SYSTOLIC BLOOD PRESSURE: 115 MMHG | DIASTOLIC BLOOD PRESSURE: 61 MMHG | RESPIRATION RATE: 17 BRPM

## 2021-07-24 LAB
ALBUMIN SERPL ELPH-MCNC: 4.1 G/DL — SIGNIFICANT CHANGE UP (ref 3.3–5)
ALP SERPL-CCNC: 56 U/L — SIGNIFICANT CHANGE UP (ref 40–120)
ALT FLD-CCNC: 36 U/L — HIGH (ref 4–33)
ANION GAP SERPL CALC-SCNC: 12 MMOL/L — SIGNIFICANT CHANGE UP (ref 7–14)
APPEARANCE UR: ABNORMAL
AST SERPL-CCNC: 37 U/L — HIGH (ref 4–32)
BACTERIA # UR AUTO: ABNORMAL
BASOPHILS # BLD AUTO: 0.01 K/UL — SIGNIFICANT CHANGE UP (ref 0–0.2)
BASOPHILS NFR BLD AUTO: 0.2 % — SIGNIFICANT CHANGE UP (ref 0–2)
BILIRUB SERPL-MCNC: 0.8 MG/DL — SIGNIFICANT CHANGE UP (ref 0.2–1.2)
BILIRUB UR-MCNC: NEGATIVE — SIGNIFICANT CHANGE UP
BUN SERPL-MCNC: 9 MG/DL — SIGNIFICANT CHANGE UP (ref 7–23)
CALCIUM SERPL-MCNC: 9.8 MG/DL — SIGNIFICANT CHANGE UP (ref 8.4–10.5)
CHLORIDE SERPL-SCNC: 103 MMOL/L — SIGNIFICANT CHANGE UP (ref 98–107)
CO2 SERPL-SCNC: 22 MMOL/L — SIGNIFICANT CHANGE UP (ref 22–31)
COLOR SPEC: YELLOW — SIGNIFICANT CHANGE UP
CREAT SERPL-MCNC: 0.37 MG/DL — LOW (ref 0.5–1.3)
DIFF PNL FLD: NEGATIVE — SIGNIFICANT CHANGE UP
EOSINOPHIL # BLD AUTO: 0.02 K/UL — SIGNIFICANT CHANGE UP (ref 0–0.5)
EOSINOPHIL NFR BLD AUTO: 0.4 % — SIGNIFICANT CHANGE UP (ref 0–6)
EPI CELLS # UR: 20 /HPF — HIGH (ref 0–5)
GLUCOSE SERPL-MCNC: 105 MG/DL — HIGH (ref 70–99)
GLUCOSE UR QL: NEGATIVE — SIGNIFICANT CHANGE UP
HCT VFR BLD CALC: 42.4 % — SIGNIFICANT CHANGE UP (ref 34.5–45)
HGB BLD-MCNC: 14 G/DL — SIGNIFICANT CHANGE UP (ref 11.5–15.5)
HYALINE CASTS # UR AUTO: 6 /LPF — SIGNIFICANT CHANGE UP (ref 0–7)
IANC: 2.57 K/UL — SIGNIFICANT CHANGE UP (ref 1.5–8.5)
IMM GRANULOCYTES NFR BLD AUTO: 0.2 % — SIGNIFICANT CHANGE UP (ref 0–1.5)
KETONES UR-MCNC: NEGATIVE — SIGNIFICANT CHANGE UP
LEUKOCYTE ESTERASE UR-ACNC: ABNORMAL
LIDOCAIN IGE QN: 46 U/L — SIGNIFICANT CHANGE UP (ref 7–60)
LYMPHOCYTES # BLD AUTO: 1.6 K/UL — SIGNIFICANT CHANGE UP (ref 1–3.3)
LYMPHOCYTES # BLD AUTO: 34.2 % — SIGNIFICANT CHANGE UP (ref 13–44)
MCHC RBC-ENTMCNC: 29 PG — SIGNIFICANT CHANGE UP (ref 27–34)
MCHC RBC-ENTMCNC: 33 GM/DL — SIGNIFICANT CHANGE UP (ref 32–36)
MCV RBC AUTO: 87.8 FL — SIGNIFICANT CHANGE UP (ref 80–100)
MONOCYTES # BLD AUTO: 0.47 K/UL — SIGNIFICANT CHANGE UP (ref 0–0.9)
MONOCYTES NFR BLD AUTO: 10 % — SIGNIFICANT CHANGE UP (ref 2–14)
NEUTROPHILS # BLD AUTO: 2.57 K/UL — SIGNIFICANT CHANGE UP (ref 1.8–7.4)
NEUTROPHILS NFR BLD AUTO: 55 % — SIGNIFICANT CHANGE UP (ref 43–77)
NITRITE UR-MCNC: NEGATIVE — SIGNIFICANT CHANGE UP
NRBC # BLD: 0 /100 WBCS — SIGNIFICANT CHANGE UP
NRBC # FLD: 0 K/UL — SIGNIFICANT CHANGE UP
PH UR: 6.5 — SIGNIFICANT CHANGE UP (ref 5–8)
PLATELET # BLD AUTO: 269 K/UL — SIGNIFICANT CHANGE UP (ref 150–400)
POTASSIUM SERPL-MCNC: 5.5 MMOL/L — HIGH (ref 3.5–5.3)
POTASSIUM SERPL-SCNC: 5.5 MMOL/L — HIGH (ref 3.5–5.3)
PROT SERPL-MCNC: 7.3 G/DL — SIGNIFICANT CHANGE UP (ref 6–8.3)
PROT UR-MCNC: ABNORMAL
RBC # BLD: 4.83 M/UL — SIGNIFICANT CHANGE UP (ref 3.8–5.2)
RBC # FLD: 12.7 % — SIGNIFICANT CHANGE UP (ref 10.3–14.5)
RBC CASTS # UR COMP ASSIST: 1 /HPF — SIGNIFICANT CHANGE UP (ref 0–4)
SODIUM SERPL-SCNC: 137 MMOL/L — SIGNIFICANT CHANGE UP (ref 135–145)
SP GR SPEC: 1.02 — SIGNIFICANT CHANGE UP (ref 1.01–1.02)
UROBILINOGEN FLD QL: SIGNIFICANT CHANGE UP
WBC # BLD: 4.68 K/UL — SIGNIFICANT CHANGE UP (ref 3.8–10.5)
WBC # FLD AUTO: 4.68 K/UL — SIGNIFICANT CHANGE UP (ref 3.8–10.5)
WBC UR QL: 94 /HPF — HIGH (ref 0–5)

## 2021-07-24 PROCEDURE — 99285 EMERGENCY DEPT VISIT HI MDM: CPT | Mod: 25

## 2021-07-24 PROCEDURE — 93010 ELECTROCARDIOGRAM REPORT: CPT

## 2021-07-24 RX ORDER — CEFTRIAXONE 500 MG/1
1000 INJECTION, POWDER, FOR SOLUTION INTRAMUSCULAR; INTRAVENOUS ONCE
Refills: 0 | Status: COMPLETED | OUTPATIENT
Start: 2021-07-24 | End: 2021-07-24

## 2021-07-24 RX ORDER — CEPHALEXIN 500 MG
1 CAPSULE ORAL
Qty: 10 | Refills: 0
Start: 2021-07-24 | End: 2021-07-28

## 2021-07-24 RX ORDER — KETOROLAC TROMETHAMINE 30 MG/ML
15 SYRINGE (ML) INJECTION ONCE
Refills: 0 | Status: DISCONTINUED | OUTPATIENT
Start: 2021-07-24 | End: 2021-07-24

## 2021-07-24 RX ADMIN — Medication 15 MILLIGRAM(S): at 09:06

## 2021-07-24 RX ADMIN — Medication 15 MILLIGRAM(S): at 09:31

## 2021-07-24 RX ADMIN — CEFTRIAXONE 100 MILLIGRAM(S): 500 INJECTION, POWDER, FOR SOLUTION INTRAMUSCULAR; INTRAVENOUS at 10:20

## 2021-07-24 NOTE — ED ADULT NURSE NOTE - NSFALLRSKUNASSIST_ED_ALL_ED
46yo F presenting for covid testing. Pt feeling well, no symptoms. Denies fever, chills, cough, sob, cp, body aches, loss of smell/taste. no

## 2021-07-24 NOTE — ED PROVIDER NOTE - CLINICAL SUMMARY MEDICAL DECISION MAKING FREE TEXT BOX
CBC, CMP, lipase, EKG, UA, urine hcg. 31F with 1-week history of abdominal pain. Most likely due to cramps/MSK/viral gastroenteritis. Will R/o ectopic, UTI, pancreatitis, etc. CBC, CMP, lipase, EKG, UA, urine hcg. Toradol for pain. Reassess. 31F with 1-week history of abdominal pain. Most likely UTI/cramps/MSK/viral gastroenteritis. Will R/o ectopic, UTI, pancreatitis, etc. CBC, CMP, lipase, EKG, UA, urine hcg. Toradol for pain. Reassess.

## 2021-07-24 NOTE — ED ADULT NURSE REASSESSMENT NOTE - NS ED NURSE REASSESS COMMENT FT1
Pt received from FAVIO braden. Pt currently resting in bed, endorsing left-sided abdominal pain that began yesterday. Pt a&ox4, neg sob, denies chest pain. Awaiting orders. Will continue to monitor.

## 2021-07-24 NOTE — ED PROVIDER NOTE - OBJECTIVE STATEMENT
31F  presented with abdominal pain and lower back pain. Lower back pains started  after vaginal delivery + epidural, unchanged, on-and-off, worsen with lying down. Has not taken anything. Abd pain started a week ago, worsen yesterday, had difficulty sleeping. In lower pelvic area, spreading to side. + nausea, + lightheadedness, + chest tightness. Denies F/C, vomiting, SOB, hematuria/dysuria/urinary frequency, diarrhea/constipation/black stools. LMP last week. Use condom. 31F  presented with abdominal pain and lower back pain. Lower back pains started  after vaginal delivery + epidural, unchanged, on-and-off, worsen with lying down. Has not taken anything. Abd pain started a week ago, worsen yesterday, had difficulty sleeping. In lower pelvic area, spreading to side. + nausea, + lightheadedness, + chest tightness. Denies F/C, vomiting, SOB, hematuria/dysuria/urinary frequency, diarrhea/constipation/black stools. LMP last week. Use condom. No history of STDs. Patient is concerned about pancreatitis.

## 2021-07-24 NOTE — ED PROVIDER NOTE - NS ED ROS FT
General: Denies fever, chills, unexpected weight loss/gain  HEENT: Reports tension headache; denies vision changes, tinnitus, epistaxis, sore throat  Cardiovascular: Reports chest tightness; denies palpitations  Skin: Denies rash, erythema, color changes  Respiratory: Denies shortness of breath, difficulty breathing, cough  Endocrine: Denies sensitivity to heat, cold, increased urination  Gastrointestinal: Reports abdominal pain + nausea; denies vomiting, diarrhea, constipation, changes in bowel habits  Musculoskeletal: Reports back pain; denies lower extremity swelling, extremity pain  Genitourinary: Denies dysuria, increased frequency  Neurologic: Denies loss of consciousness, weakness, numbness, tingling  Psychiatric: Denies history of psych, hallucinations

## 2021-07-24 NOTE — ED PROVIDER NOTE - NSFOLLOWUPINSTRUCTIONS_ED_ALL_ED_FT
You were seen in the emergency department for abdominal pain. Your bloodwork are normal. However, you have a urinary tract infection. You received antibiotics in the emergency department and feels ready to go home. Please  antibiotic prescription from your pharmacy. Take 500mg of keflex twice a day for 5 days. Please return to the emergency department if you develop fever, vomiting, and severe worsening abdominal pain. Please follow up with your primary care physician in 1-2 weeks.     ???? ???? ???????. ??? ??? ?????. ??? ?? ??? ????. ????? ???? ??? ?? ? ??? ? ? ????. ???? ??? ???? ?????. 5? ?? keflex 500mg? ??? ? ? ??????. ??, ??, ?? ?? ?? ? ???? ????? ????. 1-2? ?? ????? ?? ??? ?????.    Urinary Tract Infection in Women    WHAT YOU NEED TO KNOW:  A urinary tract infection (UTI) is caused by bacteria that get inside your urinary tract. Most bacteria that enter your urinary tract come out when you urinate. If the bacteria stay in your urinary tract, you may get an infection. Your urinary tract includes your kidneys, ureters, bladder, and urethra. Urine is made in your kidneys, and it flows from the ureters to the bladder. Urine leaves the bladder through the urethra. A UTI is more common in your lower urinary tract, which includes your bladder and urethra.     DISCHARGE INSTRUCTIONS:  Seek care immediately if:   •You are urinating very little or not at all.  •You have a high fever with shaking chills.  •You have side or back pain that gets worse.    Call your doctor if:   •You have a fever.  •You do not feel better after 2 days of taking antibiotics.  •You are vomiting.   •You have questions or concerns about your condition or care.     Medicines:   •Antibiotics help fight a bacterial infection. If you have UTIs often (called recurrent UTIs), you may be given antibiotics to take regularly. You will be given directions for when and how to use antibiotics. The goal is to prevent UTIs but not cause antibiotic resistance by using antibiotics too often.  •Medicines may be given to decrease pain and burning when you urinate. They will also help decrease the feeling that you need to urinate often. These medicines will make your urine orange or red.  •Take your medicine as directed. Contact your healthcare provider if you think your medicine is not helping or if you have side effects. Tell him or her if you are allergic to any medicine. Keep a list of the medicines, vitamins, and herbs you take. Include the amounts, and when and why you take them. Bring the list or the pill bottles to follow-up visits. Carry your medicine list with you in case of an emergency.    Follow up with your healthcare provider as directed: Write down your questions so you remember to ask them during your visits.     Prevent another UTI:   •Empty your bladder often. Urinate and empty your bladder as soon as you feel the need. Do not hold your urine for long periods of time.  •Wipe from front to back after you urinate or have a bowel movement. This will help prevent germs from getting into your urinary tract through your urethra.  •Drink liquids as directed. Ask how much liquid to drink each day and which liquids are best for you. You may need to drink more liquids than usual to help flush out the bacteria. Do not drink alcohol, caffeine, or citrus juices. These can irritate your bladder and increase your symptoms. Your healthcare provider may recommend cranberry juice to help prevent a UTI.  •Urinate after you have sex. This can help flush out bacteria passed during sex.  •Do not douche or use feminine deodorants. These can change the chemical balance in your vagina.  •Change sanitary pads or tampons often. This will help prevent germs from getting into your urinary tract.   •Talk to your healthcare provider about your birth control method. You may need to change your method if it is increasing your risk for UTIs.  •Wear cotton underwear and clothes that are loose. Tight pants and nylon underwear can trap moisture and cause bacteria to grow.  •Vaginal estrogen may be recommended. This medicine helps prevent UTIs in women who have gone through menopause or are in artur-menopause.  •Do pelvic muscle exercises often. Pelvic muscle exercises may help you start and stop urinating. Strong pelvic muscles may help you empty your bladder easier. Squeeze these muscles tightly for 5 seconds like you are trying to hold back urine. Then relax for 5 seconds. Gradually work up to squeezing for 10 seconds. Do 3 sets of 15 repetitions a day, or as directed.

## 2021-07-24 NOTE — ED PROVIDER NOTE - PHYSICAL EXAMINATION
PHYSICAL EXAM:  General: non-toxic appearing, in no respiratory distress  HEENT: atraumatic, normocephalic; pupils are equal, round and react to light, extraocular movements intact bilaterally without deficits, no conjunctival pallor, mucous membranes moist  Neck: no jugular venous distension, full range of motion  Chest/Lung: clear to auscultation bilaterally, no wheezes/rhonchi/rales  Heart: regular rate and rhythm, no murmur/gallops/rubs  Abdomen: mild tenderness in left lower abdomen; normal bowel sounds, soft, non-distended  Genitourinary: no cervical/adnexal tenderness on bimanual exam, white discharge  Extremities: no lower extremity edema, +2 radial pulses bilaterally, +2 dorsalis pedis pulses bilaterally  Musculoskeletal: full range of motion of all 4 extremities  Nervous System:  alert and oriented x3, no motor deficits or sensory deficits; CNII-XII grossly intact; no focal neurologic deficits  Skin: no rashes/lacerations noted

## 2021-07-24 NOTE — ED ADULT TRIAGE NOTE - CHIEF COMPLAINT QUOTE
Pt c/o abdominal pain and back pain for the past 2 days. Denies any N/V/D or urinary symptoms.  PMHx:  hyperthyroidism

## 2021-07-24 NOTE — ED PROVIDER NOTE - ATTENDING CONTRIBUTION TO CARE
Patient is a 32 yo F, , with history of hyperthyroidism here for evaluation of abdominal pain x 1 week. Patient denies any abdominal surgical history. Patient states she has pain across her lower abdomen. Denies fevers, chills, nausea, vomiting, urinary symptoms. No black or bloody stools.     Lithuanian speaking: I spoke to the patient via , [id number] Patient is a 30 yo F, , with history of hyperthyroidism here for evaluation of abdominal pain x 1 week. I spoke to the patient via Icelandic , [id number] denies any abdominal surgical history. Patient states she has pain across her lower abdomen. Denies fevers, chills, nausea, vomiting, urinary symptoms. No black or bloody stools. She reports history of ovarian cyst years ago. + low back pain since delivery in 2021. No dysuria, urinary frequency, change in bowel or bladder habits. She reports mild chest pain when laying down. Her abdominal pain is also worse at night. Denies vaginal bleeding or discharge. She did not try anything for her pain.    VS noted  Gen. no acute distress, Non toxic   HEENT: EOMI, mmm  Lungs: CTAB/L no C/ W /R   CVS: RRR   Abd; Soft non tender, non distended,  : done with Dr. Doshi: normal external gentialia, small white discharge, no cmt, no adnexal tenderness  Ext: no edema  Skin: no rash  Neuro AAOx3 non focal clear speech  a/p: abdominal pain - non-focal exam. plan for labs including lipase (pt concerned for pancreatitis), u/a, pain control and reassess.  - Leoncio ADAME Patient is a 32 yo F, , with history of hyperthyroidism here for evaluation of abdominal pain x 1 week. I spoke to the patient via Arabic , [id number] denies any abdominal surgical history. Patient states she has pain across her lower abdomen. Denies fevers, chills, nausea, vomiting, urinary symptoms. No black or bloody stools. She reports history of ovarian cyst years ago. + low back pain since delivery in 2021. No dysuria, urinary frequency, change in bowel or bladder habits. She reports mild chest pain when laying down. Her abdominal pain is also worse at night. Denies vaginal bleeding or discharge. She did not try anything for her pain.    VS noted  Gen. no acute distress, Non toxic   HEENT: EOMI, mmm  Lungs: CTAB/L no C/ W /R   CVS: RRR   Abd; Soft non tender, non distended, no CVA tenderness  : done with Dr. Doshi: normal external genitalia, small white discharge, no cmt, no adnexal tenderness  Ext: no edema  Skin: no rash  Neuro AAOx3 non focal clear speech  a/p: abdominal pain - non-focal exam. plan for labs including lipase (pt concerned for pancreatitis), u/a, pain control and reassess.  - Leoncio ADAME

## 2021-07-24 NOTE — ED PROVIDER NOTE - PROGRESS NOTE DETAILS
Patient has an UTI with moderate bacteria and large leukocyte esterase in the urine. Unremarkable CBC/CMP/lipase. Ceftriaxone 1g IV given in the ED. Will send patient home with keflex for 5 days. Greek speaking:  #[id number]  Provided discharge instructions to patient via the : normal bloodwork/lipase, found UTI on urinalysis. Occitan speaking:  #[id number]  Provided discharge instructions to patient via the : normal bloodwork/lipase, found UTI on urinalysis, prescribed Keflex 500mg BID for 5 days. Asked patient to come back to the ED if she developed fever/vomiting/severe worsening abdominal pain. Patient voiced understanding.

## 2021-07-24 NOTE — ED PROVIDER NOTE - PATIENT PORTAL LINK FT
You can access the FollowMyHealth Patient Portal offered by [facility] by registering at the following website: [email or web address]. By joining [employer]’s FollowMyHealth portal, you will also be able to view your health information using other applications (apps) compatible with our system.

## 2021-07-25 LAB
CULTURE RESULTS: SIGNIFICANT CHANGE UP
SPECIMEN SOURCE: SIGNIFICANT CHANGE UP

## 2021-08-11 ENCOUNTER — INPATIENT (INPATIENT)
Facility: HOSPITAL | Age: 31
LOS: 1 days | Discharge: HOME | End: 2021-08-13
Attending: LEGAL MEDICINE | Admitting: LEGAL MEDICINE
Payer: MEDICAID

## 2021-08-11 VITALS
HEART RATE: 84 BPM | DIASTOLIC BLOOD PRESSURE: 67 MMHG | RESPIRATION RATE: 18 BRPM | SYSTOLIC BLOOD PRESSURE: 106 MMHG | OXYGEN SATURATION: 99 %

## 2021-08-11 DIAGNOSIS — M54.5 LOW BACK PAIN: ICD-10-CM

## 2021-08-11 DIAGNOSIS — N39.0 URINARY TRACT INFECTION, SITE NOT SPECIFIED: ICD-10-CM

## 2021-08-11 DIAGNOSIS — Z29.9 ENCOUNTER FOR PROPHYLACTIC MEASURES, UNSPECIFIED: ICD-10-CM

## 2021-08-11 DIAGNOSIS — M54.9 DORSALGIA, UNSPECIFIED: ICD-10-CM

## 2021-08-11 DIAGNOSIS — U07.1 COVID-19: ICD-10-CM

## 2021-08-11 LAB
ALBUMIN SERPL ELPH-MCNC: 3.9 G/DL — SIGNIFICANT CHANGE UP (ref 3.3–5)
ALP SERPL-CCNC: 71 U/L — SIGNIFICANT CHANGE UP (ref 40–120)
ALT FLD-CCNC: 60 U/L — HIGH (ref 4–33)
ANION GAP SERPL CALC-SCNC: 12 MMOL/L — SIGNIFICANT CHANGE UP (ref 7–14)
APPEARANCE UR: CLEAR — SIGNIFICANT CHANGE UP
AST SERPL-CCNC: 29 U/L — SIGNIFICANT CHANGE UP (ref 4–32)
B PERT DNA SPEC QL NAA+PROBE: SIGNIFICANT CHANGE UP
B PERT+PARAPERT DNA PNL SPEC NAA+PROBE: SIGNIFICANT CHANGE UP
BACTERIA # UR AUTO: NEGATIVE — SIGNIFICANT CHANGE UP
BILIRUB SERPL-MCNC: 0.8 MG/DL — SIGNIFICANT CHANGE UP (ref 0.2–1.2)
BILIRUB UR-MCNC: NEGATIVE — SIGNIFICANT CHANGE UP
BORDETELLA PARAPERTUSSIS (RAPRVP): SIGNIFICANT CHANGE UP
BUN SERPL-MCNC: 8 MG/DL — SIGNIFICANT CHANGE UP (ref 7–23)
C PNEUM DNA SPEC QL NAA+PROBE: SIGNIFICANT CHANGE UP
CALCIUM SERPL-MCNC: 9.6 MG/DL — SIGNIFICANT CHANGE UP (ref 8.4–10.5)
CHLORIDE SERPL-SCNC: 106 MMOL/L — SIGNIFICANT CHANGE UP (ref 98–107)
CO2 SERPL-SCNC: 23 MMOL/L — SIGNIFICANT CHANGE UP (ref 22–31)
COLOR SPEC: SIGNIFICANT CHANGE UP
CREAT SERPL-MCNC: 0.39 MG/DL — LOW (ref 0.5–1.3)
DIFF PNL FLD: NEGATIVE — SIGNIFICANT CHANGE UP
EPI CELLS # UR: 2 /HPF — SIGNIFICANT CHANGE UP (ref 0–5)
FLUAV SUBTYP SPEC NAA+PROBE: SIGNIFICANT CHANGE UP
FLUBV RNA SPEC QL NAA+PROBE: SIGNIFICANT CHANGE UP
GLUCOSE SERPL-MCNC: 91 MG/DL — SIGNIFICANT CHANGE UP (ref 70–99)
GLUCOSE UR QL: NEGATIVE — SIGNIFICANT CHANGE UP
HADV DNA SPEC QL NAA+PROBE: SIGNIFICANT CHANGE UP
HCOV 229E RNA SPEC QL NAA+PROBE: SIGNIFICANT CHANGE UP
HCOV HKU1 RNA SPEC QL NAA+PROBE: SIGNIFICANT CHANGE UP
HCOV NL63 RNA SPEC QL NAA+PROBE: SIGNIFICANT CHANGE UP
HCOV OC43 RNA SPEC QL NAA+PROBE: SIGNIFICANT CHANGE UP
HCT VFR BLD CALC: 41.8 % — SIGNIFICANT CHANGE UP (ref 34.5–45)
HGB BLD-MCNC: 13.8 G/DL — SIGNIFICANT CHANGE UP (ref 11.5–15.5)
HMPV RNA SPEC QL NAA+PROBE: SIGNIFICANT CHANGE UP
HPIV1 RNA SPEC QL NAA+PROBE: SIGNIFICANT CHANGE UP
HPIV2 RNA SPEC QL NAA+PROBE: SIGNIFICANT CHANGE UP
HPIV3 RNA SPEC QL NAA+PROBE: SIGNIFICANT CHANGE UP
HPIV4 RNA SPEC QL NAA+PROBE: SIGNIFICANT CHANGE UP
KETONES UR-MCNC: NEGATIVE — SIGNIFICANT CHANGE UP
LEUKOCYTE ESTERASE UR-ACNC: NEGATIVE — SIGNIFICANT CHANGE UP
M PNEUMO DNA SPEC QL NAA+PROBE: SIGNIFICANT CHANGE UP
MCHC RBC-ENTMCNC: 28.6 PG — SIGNIFICANT CHANGE UP (ref 27–34)
MCHC RBC-ENTMCNC: 33 GM/DL — SIGNIFICANT CHANGE UP (ref 32–36)
MCV RBC AUTO: 86.7 FL — SIGNIFICANT CHANGE UP (ref 80–100)
NITRITE UR-MCNC: NEGATIVE — SIGNIFICANT CHANGE UP
NRBC # BLD: 0 /100 WBCS — SIGNIFICANT CHANGE UP
NRBC # FLD: 0 K/UL — SIGNIFICANT CHANGE UP
PH UR: 6.5 — SIGNIFICANT CHANGE UP (ref 5–8)
PLATELET # BLD AUTO: 374 K/UL — SIGNIFICANT CHANGE UP (ref 150–400)
POTASSIUM SERPL-MCNC: 4.7 MMOL/L — SIGNIFICANT CHANGE UP (ref 3.5–5.3)
POTASSIUM SERPL-SCNC: 4.7 MMOL/L — SIGNIFICANT CHANGE UP (ref 3.5–5.3)
PROT SERPL-MCNC: 6.6 G/DL — SIGNIFICANT CHANGE UP (ref 6–8.3)
PROT UR-MCNC: ABNORMAL
RAPID RVP RESULT: DETECTED
RBC # BLD: 4.82 M/UL — SIGNIFICANT CHANGE UP (ref 3.8–5.2)
RBC # FLD: 12.2 % — SIGNIFICANT CHANGE UP (ref 10.3–14.5)
RBC CASTS # UR COMP ASSIST: 1 /HPF — SIGNIFICANT CHANGE UP (ref 0–4)
RSV RNA SPEC QL NAA+PROBE: SIGNIFICANT CHANGE UP
RV+EV RNA SPEC QL NAA+PROBE: SIGNIFICANT CHANGE UP
SARS-COV-2 RNA SPEC QL NAA+PROBE: DETECTED
SODIUM SERPL-SCNC: 141 MMOL/L — SIGNIFICANT CHANGE UP (ref 135–145)
SP GR SPEC: 1.02 — SIGNIFICANT CHANGE UP (ref 1.01–1.02)
UROBILINOGEN FLD QL: SIGNIFICANT CHANGE UP
WBC # BLD: 10.58 K/UL — HIGH (ref 3.8–10.5)
WBC # FLD AUTO: 10.58 K/UL — HIGH (ref 3.8–10.5)
WBC UR QL: 4 /HPF — SIGNIFICANT CHANGE UP (ref 0–5)

## 2021-08-11 PROCEDURE — 99285 EMERGENCY DEPT VISIT HI MDM: CPT

## 2021-08-11 PROCEDURE — 99223 1ST HOSP IP/OBS HIGH 75: CPT

## 2021-08-11 PROCEDURE — 72100 X-RAY EXAM L-S SPINE 2/3 VWS: CPT | Mod: 26

## 2021-08-11 RX ORDER — ACETAMINOPHEN 500 MG
650 TABLET ORAL ONCE
Refills: 0 | Status: COMPLETED | OUTPATIENT
Start: 2021-08-11 | End: 2021-08-11

## 2021-08-11 RX ORDER — DIAZEPAM 5 MG
5 TABLET ORAL ONCE
Refills: 0 | Status: DISCONTINUED | OUTPATIENT
Start: 2021-08-11 | End: 2021-08-11

## 2021-08-11 RX ORDER — GABAPENTIN 400 MG/1
300 CAPSULE ORAL AT BEDTIME
Refills: 0 | Status: DISCONTINUED | OUTPATIENT
Start: 2021-08-11 | End: 2021-08-13

## 2021-08-11 RX ORDER — IBUPROFEN 200 MG
600 TABLET ORAL EVERY 8 HOURS
Refills: 0 | Status: DISCONTINUED | OUTPATIENT
Start: 2021-08-11 | End: 2021-08-13

## 2021-08-11 RX ORDER — MORPHINE SULFATE 50 MG/1
4 CAPSULE, EXTENDED RELEASE ORAL ONCE
Refills: 0 | Status: DISCONTINUED | OUTPATIENT
Start: 2021-08-11 | End: 2021-08-11

## 2021-08-11 RX ORDER — ENOXAPARIN SODIUM 100 MG/ML
40 INJECTION SUBCUTANEOUS DAILY
Refills: 0 | Status: DISCONTINUED | OUTPATIENT
Start: 2021-08-11 | End: 2021-08-13

## 2021-08-11 RX ORDER — GABAPENTIN 400 MG/1
1 CAPSULE ORAL
Qty: 0 | Refills: 0 | DISCHARGE

## 2021-08-11 RX ORDER — KETOROLAC TROMETHAMINE 30 MG/ML
30 SYRINGE (ML) INJECTION ONCE
Refills: 0 | Status: DISCONTINUED | OUTPATIENT
Start: 2021-08-11 | End: 2021-08-11

## 2021-08-11 RX ORDER — OXYCODONE HYDROCHLORIDE 5 MG/1
5 TABLET ORAL ONCE
Refills: 0 | Status: DISCONTINUED | OUTPATIENT
Start: 2021-08-11 | End: 2021-08-11

## 2021-08-11 RX ORDER — ACETAMINOPHEN 500 MG
650 TABLET ORAL EVERY 6 HOURS
Refills: 0 | Status: DISCONTINUED | OUTPATIENT
Start: 2021-08-11 | End: 2021-08-13

## 2021-08-11 RX ORDER — GABAPENTIN 400 MG/1
100 CAPSULE ORAL THREE TIMES A DAY
Refills: 0 | Status: DISCONTINUED | OUTPATIENT
Start: 2021-08-11 | End: 2021-08-11

## 2021-08-11 RX ADMIN — ENOXAPARIN SODIUM 40 MILLIGRAM(S): 100 INJECTION SUBCUTANEOUS at 22:14

## 2021-08-11 RX ADMIN — GABAPENTIN 300 MILLIGRAM(S): 400 CAPSULE ORAL at 22:14

## 2021-08-11 RX ADMIN — Medication 5 MILLIGRAM(S): at 12:47

## 2021-08-11 RX ADMIN — Medication 30 MILLIGRAM(S): at 11:57

## 2021-08-11 RX ADMIN — OXYCODONE HYDROCHLORIDE 5 MILLIGRAM(S): 5 TABLET ORAL at 11:57

## 2021-08-11 RX ADMIN — MORPHINE SULFATE 4 MILLIGRAM(S): 50 CAPSULE, EXTENDED RELEASE ORAL at 14:43

## 2021-08-11 RX ADMIN — Medication 600 MILLIGRAM(S): at 18:38

## 2021-08-11 RX ADMIN — Medication 650 MILLIGRAM(S): at 11:56

## 2021-08-11 NOTE — H&P ADULT - NSHPLABSRESULTS_GEN_ALL_CORE
13.8   10.58 )-----------( 374      ( 11 Aug 2021 13:28 )             41.8     CBC Full  -  ( 11 Aug 2021 13:28 )  WBC Count : 10.58 K/uL  RBC Count : 4.82 M/uL  Hemoglobin : 13.8 g/dL  Hematocrit : 41.8 %  Platelet Count - Automated : 374 K/uL        141  |  106  |  8   ----------------------------<  91  4.7   |  23  |  0.39<L>    Ca    9.6      11 Aug 2021 13:28    TPro  6.6  /  Alb  3.9  /  TBili  0.8  /  DBili  x   /  AST  29  /  ALT  60<H>  /  AlkPhos  71  0811    Urinalysis Basic - ( 11 Aug 2021 11:28 )    Color: Light Yellow / Appearance: Clear / S.016 / pH: x  Gluc: x / Ketone: Negative  / Bili: Negative / Urobili: <2 mg/dL   Blood: x / Protein: Trace / Nitrite: Negative   Leuk Esterase: Negative / RBC: 1 /HPF / WBC 4 /HPF   Sq Epi: x / Non Sq Epi: 2 /HPF / Bacteria: Negative    SARS-CoV-2: Detected (11 Aug 2021 13:44)

## 2021-08-11 NOTE — ED PROVIDER NOTE - PROGRESS NOTE DETAILS
Gong: Patient noted to have severe persistent pain, unable to ambulate.  Will order morphine for pain relief and admit to medicine.

## 2021-08-11 NOTE — ED PROVIDER NOTE - NS ED ROS FT
REVIEW OF SYSTEMS:     CONSTITUTIONAL: No fever, weight loss, or fatigue  EYES: No eye pain, visual disturbances, or discharge  ENMT:  No difficulty hearing, tinnitus, vertigo; No sinus or throat pain  NECK: No pain or stiffness  RESPIRATORY: No cough, wheezing, chills or hemoptysis; No shortness of breath  CARDIOVASCULAR: No chest pain, palpitations, dizziness, or leg swelling  GASTROINTESTINAL: No abdominal or epigastric pain. No nausea, vomiting, or hematemesis; No diarrhea or constipation. No melena or hematochezia.  GENITOURINARY: No dysuria, frequency, hematuria, or incontinence  NEUROLOGICAL: No headaches, memory loss, loss of strength, numbness, or tremors  MSK + back pain  SKIN: No itching, burning, rashes, or lesions

## 2021-08-11 NOTE — H&P ADULT - NEGATIVE GENERAL GENITOURINARY SYMPTOMS
no hematuria/no flank pain L/no flank pain R/no incontinence/no dysuria/no urinary hesitancy/normal urinary frequency

## 2021-08-11 NOTE — ED ADULT NURSE NOTE - OBJECTIVE STATEMENT
Pt received to intake presents with lower back pain. pt unable to move without pain. pt a&ox4, ambulatory at baseline, skin intact, denies trauma to area. pt reports she had Covid on July 24th-currently asymptomatic. will endorse report to primary RNMarcella.

## 2021-08-11 NOTE — H&P ADULT - HISTORY OF PRESENT ILLNESS
31 year old female with no PMHx presents to [facility] ED with severe lower back pain that radiates down both LEs. Patient states that she was lifting her 7 kg child when she aggravated her lower back. Patient states that her back pain originally began after receiving a blood patch after an epidural during her recent pregnancy in February. Patient states that the pain now spread across her lower back and the pain shoots down both extremities, more severe in the LLE. No specific body position makes the pain better or worse. Pain is 10/10 prior to given pain meds in ED and now 8/10 after given meds. Patient reports that she has been taking Gabapentin 100mg QD for the past 3 days with minimal to no relief. LMP 7/18/2021. The patient denies any fever, chills, chest pain, loss of sensation in the lower extremities, bladder and bowel incontinence, urinary frequency, retention and urgency, vomiting, dysuria, diarrhea, and constipation.  31 year old female with no PMHx presents to [facility] ED with severe lower back pain that radiates down both LEs. Patient states that she was lifting her 7 kg child when she aggravated her lower back. Patient states that her back pain originally began after receiving a blood patch after an epidural during her recent pregnancy in February. Patient states that the pain now spread across her lower back and the pain shoots down both extremities, more severe in the LLE. No specific body position makes the pain better or worse. Pain is 10/10 prior to given pain meds in ED and now 8/10 after given meds. Patient reports that she has been taking Gabapentin 100mg QD for the past 3 days with minimal to no relief. LMP 7/18/2021 and COVID (+) on 7/24. The patient denies any fever, chills, chest pain, loss of sensation in the lower extremities, bladder and bowel incontinence, urinary frequency, retention and urgency, vomiting, dysuria, diarrhea, and constipation.  31 year old female with no PMHx presents to [facility] ED with severe lower back pain that radiates down both LEs. Patient states that she was lifting her 7 kg child when she aggravated her lower back. Patient states that her back pain originally began after receiving a blood patch after an epidural during her recent pregnancy in February. Patient states that the pain now spread across her lower back and the pain shoots down both extremities, more severe in the LLE. She has been unable to ambulate  No specific body position makes the pain better or worse. Pain is 10/10 prior to given pain meds in ED and now 8/10 after given meds. Patient reports that she has been taking Gabapentin 100mg QD for the past 3 days with minimal to no relief. LMP 7/18/2021 and COVID (+) on 7/27. The patient denies any fever, chills, chest pain, loss of sensation in the lower extremities or thighs, bladder and bowel incontinence, urinary frequency, retention and urgency, vomiting, dysuria, diarrhea, and constipation.

## 2021-08-11 NOTE — H&P ADULT - NSHPPHYSICALEXAM_GEN_ALL_CORE
ICU Vital Signs Last 24 Hrs  T(C): 35.8 (11 Aug 2021 16:05), Max: 36.3 (11 Aug 2021 11:05)  T(F): 96.5 (11 Aug 2021 16:05), Max: 97.4 (11 Aug 2021 11:05)  HR: 73 (11 Aug 2021 16:05) (73 - 86)  BP: 100/50 (11 Aug 2021 16:05) (100/50 - 106/67)  BP(mean): --  ABP: --  ABP(mean): --  RR: 17 (11 Aug 2021 16:05) (16 - 18)  SpO2: 100% (11 Aug 2021 16:05) (99% - 100%)

## 2021-08-11 NOTE — H&P ADULT - ASSESSMENT
31 year old female with no PMHx presents to [facility] ED with severe lower back pain that radiates down both LEs. Patient to be admitted for intractable back pain and the inability to ambulate because of pain. 31 year old female with no PMHx presents to [facility] ED with severe lower back pain that radiates down both LEs. Patient to be admitted for intractable back pain and the inability to ambulate because of pain.

## 2021-08-11 NOTE — H&P ADULT - PROBLEM SELECTOR PLAN 2
- Low DVT risk. Encourage ambulation.  - Compression stockings if unable to ambulate. Initial Covid positive on 7/27  Patient currently on room air and asymptomatic from covid perspective  No current indication for further treatment of COVID.   Continue isolation per infection control recommendations.

## 2021-08-11 NOTE — H&P ADULT - NEUROLOGICAL DETAILS
alert and oriented x 3/responds to verbal commands/sensation intact/cranial nerves intact/no spontaneous movement/normal strength

## 2021-08-11 NOTE — H&P ADULT - NSHPSOCIALHISTORY_GEN_ALL_CORE
Patient is a  who is  and has three children.  Patient denies any tobacco, alcohol, and illicit drug use.

## 2021-08-11 NOTE — H&P ADULT - BACK EXAM
TTP lower back/normal/normal shape TTP lower back. Able to straight leg lift Left > Right lower extremity/normal/normal shape normal/normal shape

## 2021-08-11 NOTE — H&P ADULT - PROBLEM SELECTOR PLAN 3
- Low DVT risk. Encourage ambulation.  - Compression stockings if unable to ambulate. - on 7/24 UA grossly positive and treated as outpatient with cephalexin 500mg BID x5 days  - Currently symptom free  - If develops fever can consider additional treatment.

## 2021-08-11 NOTE — ED ADULT NURSE NOTE - NSIMPLEMENTINTERV_GEN_ALL_ED
Implemented All Fall Risk Interventions:  [place] to call system. Call bell, personal items and telephone within reach. Instruct patient to call for assistance. Room bathroom lighting operational. Non-slip footwear when patient is off stretcher. Physically safe environment: no spills, clutter or unnecessary equipment. Stretcher in lowest position, wheels locked, appropriate side rails in place. Provide visual cue, wrist band, yellow gown, etc. Monitor gait and stability. Monitor for mental status changes and reorient to person, place, and time. Review medications for side effects contributing to fall risk. Reinforce activity limits and safety measures with patient and family.

## 2021-08-11 NOTE — H&P ADULT - ATTENDING COMMENTS
H&P as written above, I made the necessary addition and changes     31 year old female with no PMHx presents to [facility] ED with severe lower back pain that radiates down both LEs, c/w sciatica.     -No red flags such as fever, bowel/urinary incontinence, motor deficits or loss of sensation   -Pain control with gabapentin, tylnol and motrin   -Xray lumbar spine shows no acute fracture   -PT evaluation H&P as written above, I made the necessary addition and changes     31 year old female with no PMHx presents to [facility] ED with severe lower back pain that radiates down both LEs, c/w sciatica.     -No red flags such as fever, bowel/urinary incontinence, motor deficits or loss of sensation   -Pain control with gabapentin, tylnol and motrin   -Xray lumbar spine shows no acute fracture   -PT evaluation    COVID-19   -Sating well on RA  -No medication warranted       UTI   -recently diagnosed   -Urine cx negative   -S/p 4-5 days of keflex   -Stable and resolved

## 2021-08-11 NOTE — H&P ADULT - NEGATIVE NEUROLOGICAL SYMPTOMS
no weakness/no syncope/no tremors/no vertigo/no loss of sensation/no loss of consciousness/no confusion

## 2021-08-11 NOTE — ED PROVIDER NOTE - OBJECTIVE STATEMENT
32 yo F with no PMH a/w lower back pain starting 3 days ago.  Pt reports having worsening severe back pain while lifting patient's daughter.  Noted to have increasing pain and unable to ambulate over the past 24 hours 2/2 pain.  Pt reports pain is 7 /10 radiating down L leg.  Pt reports going to PMD who prescribed gabapentin 100mg which she started taking yesterday with minimal relief.  No fevers, chills, recent spinal procedures, bowel/bladder incontinence, IVDU, cancer, > 51 yo, recent weight loss, trauma ,weakness numbness, dysuria, hematuria.     Reports recently been COVID + on 7/24 and recently treated for UTI.
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
## 2021-08-11 NOTE — ED PROVIDER NOTE - CLINICAL SUMMARY MEDICAL DECISION MAKING FREE TEXT BOX
30 yo F with no PMH a/w lower back pain starting 3 days ago, when worsens when lifting patient's daughter.  No warning signs, likely sciatica, no relief with gabapentin that was recently started.  Will treat symptomatically and reassess.

## 2021-08-11 NOTE — H&P ADULT - MUSCULOSKELETAL
details… normal/ROM intact/no joint swelling/no joint erythema/no calf tenderness/normal strength detailed exam

## 2021-08-11 NOTE — H&P ADULT - NSHPOUTPATIENTPROVIDERS_GEN_ALL_CORE
PCP: Edgardo Guadarrmaa  Pharm: [facility] [id number] [facility]

## 2021-08-11 NOTE — ED PROVIDER NOTE - PHYSICAL EXAMINATION
GEN - NAD; well appearing; A+O x3; non-toxic appearing  CARD -s1s2, RRR, no M,G,R;   PULM - CTA b/l, symmetric breath sounds;   ABD -  +BS, ND, NT, soft, no guarding, no rebound, no masses;   BACK - no CVA tenderness, Normal  spine; TTP in lower back, L paraspinal tenderness > than R.  bilateral straight leg raise to L 15 degree and R @ 45 degrees.   EXT - symmetric pulses, 2+ dp, capillary refill < 2 seconds, no cyanosis, no edema;   NEURO - no focal neuro deficits, no slurred speech

## 2021-08-12 ENCOUNTER — TRANSCRIPTION ENCOUNTER (OUTPATIENT)
Age: 31
End: 2021-08-12

## 2021-08-12 LAB
A1C WITH ESTIMATED AVERAGE GLUCOSE RESULT: 5.8 % — HIGH (ref 4–5.6)
ANION GAP SERPL CALC-SCNC: 14 MMOL/L — SIGNIFICANT CHANGE UP (ref 7–14)
BUN SERPL-MCNC: 10 MG/DL — SIGNIFICANT CHANGE UP (ref 7–23)
CALCIUM SERPL-MCNC: 9.7 MG/DL — SIGNIFICANT CHANGE UP (ref 8.4–10.5)
CHLORIDE SERPL-SCNC: 103 MMOL/L — SIGNIFICANT CHANGE UP (ref 98–107)
CHOLEST SERPL-MCNC: 130 MG/DL — SIGNIFICANT CHANGE UP
CO2 SERPL-SCNC: 23 MMOL/L — SIGNIFICANT CHANGE UP (ref 22–31)
COVID-19 SPIKE DOMAIN AB INTERP: POSITIVE
COVID-19 SPIKE DOMAIN ANTIBODY RESULT: 6.5 U/ML — HIGH
CREAT SERPL-MCNC: 0.42 MG/DL — LOW (ref 0.5–1.3)
ESTIMATED AVERAGE GLUCOSE: 120 — SIGNIFICANT CHANGE UP
GLUCOSE SERPL-MCNC: 74 MG/DL — SIGNIFICANT CHANGE UP (ref 70–99)
HCT VFR BLD CALC: 41 % — SIGNIFICANT CHANGE UP (ref 34.5–45)
HDLC SERPL-MCNC: 47 MG/DL — LOW
HGB BLD-MCNC: 13.7 G/DL — SIGNIFICANT CHANGE UP (ref 11.5–15.5)
LIPID PNL WITH DIRECT LDL SERPL: 67 MG/DL — SIGNIFICANT CHANGE UP
MAGNESIUM SERPL-MCNC: 1.9 MG/DL — SIGNIFICANT CHANGE UP (ref 1.6–2.6)
MCHC RBC-ENTMCNC: 28.5 PG — SIGNIFICANT CHANGE UP (ref 27–34)
MCHC RBC-ENTMCNC: 33.4 GM/DL — SIGNIFICANT CHANGE UP (ref 32–36)
MCV RBC AUTO: 85.2 FL — SIGNIFICANT CHANGE UP (ref 80–100)
NON HDL CHOLESTEROL: 83 MG/DL — SIGNIFICANT CHANGE UP
NRBC # BLD: 0 /100 WBCS — SIGNIFICANT CHANGE UP
NRBC # FLD: 0 K/UL — SIGNIFICANT CHANGE UP
PHOSPHATE SERPL-MCNC: 4.7 MG/DL — HIGH (ref 2.5–4.5)
PLATELET # BLD AUTO: 350 K/UL — SIGNIFICANT CHANGE UP (ref 150–400)
POTASSIUM SERPL-MCNC: 4.4 MMOL/L — SIGNIFICANT CHANGE UP (ref 3.5–5.3)
POTASSIUM SERPL-SCNC: 4.4 MMOL/L — SIGNIFICANT CHANGE UP (ref 3.5–5.3)
RBC # BLD: 4.81 M/UL — SIGNIFICANT CHANGE UP (ref 3.8–5.2)
RBC # FLD: 12.2 % — SIGNIFICANT CHANGE UP (ref 10.3–14.5)
SARS-COV-2 IGG+IGM SERPL QL IA: 6.5 U/ML — HIGH
SARS-COV-2 IGG+IGM SERPL QL IA: POSITIVE
SODIUM SERPL-SCNC: 140 MMOL/L — SIGNIFICANT CHANGE UP (ref 135–145)
TRIGL SERPL-MCNC: 81 MG/DL — SIGNIFICANT CHANGE UP
WBC # BLD: 8.63 K/UL — SIGNIFICANT CHANGE UP (ref 3.8–10.5)
WBC # FLD AUTO: 8.63 K/UL — SIGNIFICANT CHANGE UP (ref 3.8–10.5)

## 2021-08-12 RX ORDER — ACETAMINOPHEN 500 MG
2 TABLET ORAL
Qty: 80 | Refills: 0
Start: 2021-08-12 | End: 2021-08-21

## 2021-08-12 RX ORDER — CYCLOBENZAPRINE HYDROCHLORIDE 10 MG/1
5 TABLET, FILM COATED ORAL THREE TIMES A DAY
Refills: 0 | Status: DISCONTINUED | OUTPATIENT
Start: 2021-08-12 | End: 2021-08-13

## 2021-08-12 RX ORDER — IBUPROFEN 200 MG
1 TABLET ORAL
Qty: 15 | Refills: 0
Start: 2021-08-12 | End: 2021-08-16

## 2021-08-12 RX ORDER — CYCLOBENZAPRINE HYDROCHLORIDE 10 MG/1
1 TABLET, FILM COATED ORAL
Qty: 15 | Refills: 0
Start: 2021-08-12 | End: 2021-08-16

## 2021-08-12 RX ADMIN — GABAPENTIN 300 MILLIGRAM(S): 400 CAPSULE ORAL at 22:19

## 2021-08-12 RX ADMIN — Medication 600 MILLIGRAM(S): at 08:57

## 2021-08-12 RX ADMIN — Medication 650 MILLIGRAM(S): at 14:25

## 2021-08-12 RX ADMIN — Medication 650 MILLIGRAM(S): at 14:55

## 2021-08-12 RX ADMIN — ENOXAPARIN SODIUM 40 MILLIGRAM(S): 100 INJECTION SUBCUTANEOUS at 14:25

## 2021-08-12 RX ADMIN — Medication 600 MILLIGRAM(S): at 09:57

## 2021-08-12 RX ADMIN — Medication 650 MILLIGRAM(S): at 06:47

## 2021-08-12 RX ADMIN — CYCLOBENZAPRINE HYDROCHLORIDE 5 MILLIGRAM(S): 10 TABLET, FILM COATED ORAL at 14:26

## 2021-08-12 NOTE — PROGRESS NOTE ADULT - ASSESSMENT
31 year old female with no PMHx presents to [facility] ED with severe lower back pain that radiates down both LEs. Patient to be admitted for intractable back pain and the inability to ambulate because of pain.

## 2021-08-12 NOTE — PROGRESS NOTE ADULT - SUBJECTIVE AND OBJECTIVE BOX
FIFI VENCES  31y  Female      Patient is a 31y old  Female who presents with a chief complaint of Intractable back pain (12 Aug 2021 13:50)  Patient was seen and examined.chart reviewed.Admitted with back pain,improving.no h/o fall    REVIEW OF SYSTEMS:  CONSTITUTIONAL: No fever  RESPIRATORY: No cough, hemoptysis or shortness of breath  CARDIOVASCULAR: No chest pain, palpitations, dizziness, or leg swelling  GASTROINTESTINAL: No abdominal pain. nausea, vomiting, hematemesis  GENITOURINARY: No dysuria, frequency, hematuria   NEUROLOGICAL: No headaches, no dizziness  MUSCULOSKELETAL: No joint pain or swelling;     INTERVAL HPI/OVERNIGHT EVENTS:  T(C): 36.9 (21 @ 22:32), Max: 36.9 (21 @ 22:32)  HR: 100 (21 @ 22:32) (87 - 133)  BP: 102/59 (21 @ 22:32) (100/48 - 111/63)  RR: 18 (21 @ 22:32) (16 - 18)  SpO2: 100% (21 @ 22:32) (99% - 100%)  Wt(kg): --  I&O's Summary    T(C): 36.9 (21 @ 22:32), Max: 36.9 (21 @ 22:32)  HR: 100 (21 @ 22:32) (87 - 133)  BP: 102/59 (21 @ 22:32) (100/48 - 111/63)  RR: 18 (21 @ 22:32) (16 - 18)  SpO2: 100% (21 @ 22:32) (99% - 100%)  Wt(kg): --Vital Signs Last 24 Hrs  T(C): 36.9 (12 Aug 2021 22:32), Max: 36.9 (12 Aug 2021 22:32)  T(F): 98.5 (12 Aug 2021 22:32), Max: 98.5 (12 Aug 2021 22:32)  HR: 100 (12 Aug 2021 22:32) (87 - 133)  BP: 102/59 (12 Aug 2021 22:32) (100/48 - 111/63)  BP(mean): --  RR: 18 (12 Aug 2021 22:32) (16 - 18)  SpO2: 100% (12 Aug 2021 22:32) (99% - 100%)    LABS:                        13.7   8.63  )-----------( 350      ( 12 Aug 2021 06:49 )             41.0     08-12    140  |  103  |  10  ----------------------------<  74  4.4   |  23  |  0.42<L>    Ca    9.7      12 Aug 2021 06:49  Phos  4.7     08-12  Mg     1.90     08-12    TPro  6.6  /  Alb  3.9  /  TBili  0.8  /  DBili  x   /  AST  29  /  ALT  60<H>  /  AlkPhos  71  08-11      Urinalysis Basic - ( 11 Aug 2021 11:28 )    Color: Light Yellow / Appearance: Clear / S.016 / pH: x  Gluc: x / Ketone: Negative  / Bili: Negative / Urobili: <2 mg/dL   Blood: x / Protein: Trace / Nitrite: Negative   Leuk Esterase: Negative / RBC: 1 /HPF / WBC 4 /HPF   Sq Epi: x / Non Sq Epi: 2 /HPF / Bacteria: Negative      CAPILLARY BLOOD GLUCOSE            Urinalysis Basic - ( 11 Aug 2021 11:28 )    Color: Light Yellow / Appearance: Clear / S.016 / pH: x  Gluc: x / Ketone: Negative  / Bili: Negative / Urobili: <2 mg/dL   Blood: x / Protein: Trace / Nitrite: Negative   Leuk Esterase: Negative / RBC: 1 /HPF / WBC 4 /HPF   Sq Epi: x / Non Sq Epi: 2 /HPF / Bacteria: Negative        PAST MEDICAL & SURGICAL HISTORY:  Back pain    No significant past surgical history        MEDICATIONS  (STANDING):  enoxaparin Injectable 40 milliGRAM(s) SubCutaneous daily  gabapentin 300 milliGRAM(s) Oral at bedtime    MEDICATIONS  (PRN):  acetaminophen   Tablet .. 650 milliGRAM(s) Oral every 6 hours PRN Mild Pain (1 - 3)  cyclobenzaprine 5 milliGRAM(s) Oral three times a day PRN Muscle Spasm  ibuprofen  Tablet. 600 milliGRAM(s) Oral every 8 hours PRN Moderate Pain (4 - 6), Severe Pain (7 - 10)        RADIOLOGY & ADDITIONAL TESTS:    Imaging Personally Reviewed:  [ ] YES  [ ] NO    Consultant(s) Notes Reviewed:  [x ] YES  [ ] NO    PHYSICAL EXAM:  GENERAL: Alert and awake lying in bed in no distress  HEAD:  Atraumatic, Normocephalic  EYES: EOMI, YOLIS, conjunctiva and sclera clear  NECK: Supple, No JVD, Normal thyroid  NERVOUS SYSTEM:  Alert & Oriented X3, Motor and sensory systems are intact,   CHEST/LUNG: Bilateral clear breath sounds, no rhochi, no wheezing, no crepitations,  HEART: Regular rate and rhythm; No murmurs, rubs, or gallops  ABDOMEN: Soft, Nontender, Nondistended; Bowel sounds present  EXTREMITIES:   Peripheral Pulses are palpable, no  edema        Care Discussed with Consultants/Other Providers [ ] YES  [ ] NO      Code Status: [] Full Code [] DNR [] DNI [] Goals of Care:   Disposition: [] ICU [] Stroke Unit [] RCU []PCU []Floor [] Discharge Home         NATACHA AlejandreFACP

## 2021-08-12 NOTE — PHYSICAL THERAPY INITIAL EVALUATION ADULT - PERTINENT HX OF CURRENT PROBLEM, REHAB EVAL
31 year old female with no PMHx presents to [facility] ED with severe lower back pain that radiates down both LEs. Patient states that she was lifting her 7 kg child when she aggravated her lower back. Patient states that her back pain originally began after receiving a blood patch after an epidural during her recent pregnancy in February. Patient states that the pain now spread across her lower back and the pain shoots down both extremities, more severe in the LLE.

## 2021-08-12 NOTE — PHYSICAL THERAPY INITIAL EVALUATION ADULT - ADDITIONAL COMMENTS
Patient lives with her  and children in private home, there are 2 steps to enter and no steps to bedroom/bathroom. Patient was independent in all ADL prior to admission. Patient was not using assistive device prior to admission.

## 2021-08-12 NOTE — DISCHARGE NOTE PROVIDER - CARE PROVIDER_API CALL
MARCELINA HAWKINS  [id number]  [address], [address]  [place] [address]  Phone: [phone]  Fax: [phone]  Established Patient  Follow Up Time: 1 week

## 2021-08-12 NOTE — PROGRESS NOTE ADULT - PROBLEM SELECTOR PLAN 3
- on 7/24 UA grossly positive and treated as outpatient with cephalexin 500mg BID x5 days  - Currently symptom free  - If develops fever can consider additional treatment.

## 2021-08-12 NOTE — DISCHARGE NOTE PROVIDER - NSDCMRMEDTOKEN_GEN_ALL_CORE_FT
acetaminophen 325 mg oral tablet: 2 tabs orally every 6 hours, as needed for mild pain (1 - 3)  cyclobenzaprine 5 mg oral tablet: 1 tab(s) orally 3 times a day, as needed for muscle spasm or pain unrelieved by Tylenol/Motrin  gabapentin 100 mg oral capsule: 1 cap(s) orally once a day  ibuprofen 600 mg oral tablet: 1 tab orally every 8 hours (with food), as needed for moderate - severe pain (4 - 10)

## 2021-08-12 NOTE — PROGRESS NOTE ADULT - PROBLEM SELECTOR PLAN 2
Initial Covid positive on 7/27  Patient currently on room air and asymptomatic from covid perspective  No current indication for further treatment of COVID.   Continue isolation per infection control recommendations.

## 2021-08-12 NOTE — DISCHARGE NOTE PROVIDER - NSDCCPCAREPLAN_GEN_ALL_CORE_FT
PRINCIPAL DISCHARGE DIAGNOSIS  Diagnosis: Back pain  Assessment and Plan of Treatment: Unclear cause of your pain and your xray was negative for fractures. Your pain improved with rest and pain relievers. Continue taking Tylneol, Motrin (with food) and Flexeril as needed for pain. Follow up with your primary care provider for further management recommendations within 1 week.      SECONDARY DISCHARGE DIAGNOSES  Diagnosis: Inability to walk  Assessment and Plan of Treatment: Improved with pain control.

## 2021-08-13 ENCOUNTER — TRANSCRIPTION ENCOUNTER (OUTPATIENT)
Age: 31
End: 2021-08-13

## 2021-08-13 VITALS
SYSTOLIC BLOOD PRESSURE: 100 MMHG | OXYGEN SATURATION: 100 % | RESPIRATION RATE: 16 BRPM | DIASTOLIC BLOOD PRESSURE: 59 MMHG | HEART RATE: 82 BPM | TEMPERATURE: 98 F

## 2021-08-13 RX ADMIN — ENOXAPARIN SODIUM 40 MILLIGRAM(S): 100 INJECTION SUBCUTANEOUS at 12:25

## 2021-08-13 NOTE — DISCHARGE NOTE NURSING/CASE MANAGEMENT/SOCIAL WORK - PATIENT PORTAL LINK FT
You can access the FollowMyHealth Patient Portal offered by [facility] by registering at the following website: [email or web address]. By joining [facility]’s FollowMyHealth portal, you will also be able to view your health information using other applications (apps) compatible with our system.

## 2022-05-13 NOTE — ED PROVIDER NOTE - TELEPHONIC ID NUMBER OF THE INTERPRETER
Patient called k pod number for schedule f/u with Teresa Cox. Called back and lvm again for pt to reach out.  
[phone]

## 2023-03-03 PROBLEM — M54.9 DORSALGIA, UNSPECIFIED: Chronic | Status: ACTIVE | Noted: 2021-08-11

## 2023-03-10 PROBLEM — Z00.00 ENCOUNTER FOR PREVENTIVE HEALTH EXAMINATION: Status: ACTIVE | Noted: 2023-03-10

## 2023-04-03 ENCOUNTER — APPOINTMENT (OUTPATIENT)
Dept: OTOLARYNGOLOGY | Facility: CLINIC | Age: 33
End: 2023-04-03

## 2025-01-23 NOTE — H&P ADULT - NSRESEARCHGRANTASSESS_GEN_A_CORE
Additional Notes: Courtesy cryotherapy performed on solar lentigo Detail Level: Simple Render Risk Assessment In Note?: no <<--- Click to launch IMPROVE-DD VTE Assessment